# Patient Record
Sex: FEMALE | Race: BLACK OR AFRICAN AMERICAN | Employment: UNEMPLOYED | URBAN - METROPOLITAN AREA
[De-identification: names, ages, dates, MRNs, and addresses within clinical notes are randomized per-mention and may not be internally consistent; named-entity substitution may affect disease eponyms.]

---

## 2017-09-14 ENCOUNTER — HOSPITAL ENCOUNTER (EMERGENCY)
Age: 27
Discharge: HOME | End: 2017-09-14
Attending: EMERGENCY MEDICINE
Payer: COMMERCIAL

## 2017-09-14 ENCOUNTER — APPOINTMENT (OUTPATIENT)
Dept: ULTRASOUND IMAGING | Age: 27
End: 2017-09-14
Attending: EMERGENCY MEDICINE
Payer: COMMERCIAL

## 2017-09-14 VITALS
WEIGHT: 215 LBS | BODY MASS INDEX: 33.74 KG/M2 | SYSTOLIC BLOOD PRESSURE: 110 MMHG | TEMPERATURE: 98.8 F | OXYGEN SATURATION: 99 % | DIASTOLIC BLOOD PRESSURE: 65 MMHG | HEART RATE: 78 BPM | HEIGHT: 67 IN | RESPIRATION RATE: 18 BRPM

## 2017-09-14 DIAGNOSIS — Z34.90 INTRAUTERINE PREGNANCY: ICD-10-CM

## 2017-09-14 DIAGNOSIS — N94.9 ADNEXAL CYST: Primary | ICD-10-CM

## 2017-09-14 LAB
ALBUMIN SERPL-MCNC: 3.5 G/DL (ref 3.5–5)
ALBUMIN/GLOB SERPL: 0.7 {RATIO} (ref 1.2–3.5)
ALP SERPL-CCNC: 91 U/L (ref 50–136)
ALT SERPL-CCNC: 37 U/L (ref 12–65)
ANION GAP SERPL CALC-SCNC: 10 MMOL/L (ref 7–16)
AST SERPL-CCNC: 47 U/L (ref 15–37)
BILIRUB SERPL-MCNC: 0.3 MG/DL (ref 0.2–1.1)
BUN SERPL-MCNC: 8 MG/DL (ref 6–23)
CALCIUM SERPL-MCNC: 8.9 MG/DL (ref 8.3–10.4)
CHLORIDE SERPL-SCNC: 101 MMOL/L (ref 98–107)
CO2 SERPL-SCNC: 27 MMOL/L (ref 21–32)
CREAT SERPL-MCNC: 0.6 MG/DL (ref 0.6–1)
ERYTHROCYTE [DISTWIDTH] IN BLOOD BY AUTOMATED COUNT: 15 % (ref 11.9–14.6)
GLOBULIN SER CALC-MCNC: 4.8 G/DL (ref 2.3–3.5)
GLUCOSE SERPL-MCNC: 100 MG/DL (ref 65–100)
HCG SERPL-ACNC: ABNORMAL MIU/ML (ref 0–6)
HCG UR QL: POSITIVE
HCT VFR BLD AUTO: 36.4 % (ref 35.8–46.3)
HGB BLD-MCNC: 11.5 G/DL (ref 11.7–15.4)
MCH RBC QN AUTO: 27.6 PG (ref 26.1–32.9)
MCHC RBC AUTO-ENTMCNC: 31.6 G/DL (ref 31.4–35)
MCV RBC AUTO: 87.3 FL (ref 79.6–97.8)
PLATELET # BLD AUTO: 387 K/UL (ref 150–450)
PMV BLD AUTO: 9.2 FL (ref 10.8–14.1)
POTASSIUM SERPL-SCNC: 4.1 MMOL/L (ref 3.5–5.1)
PROT SERPL-MCNC: 8.3 G/DL (ref 6.3–8.2)
RBC # BLD AUTO: 4.17 M/UL (ref 4.05–5.25)
SODIUM SERPL-SCNC: 138 MMOL/L (ref 136–145)
WBC # BLD AUTO: 14.5 K/UL (ref 4.3–11.1)

## 2017-09-14 PROCEDURE — 81025 URINE PREGNANCY TEST: CPT

## 2017-09-14 PROCEDURE — 99284 EMERGENCY DEPT VISIT MOD MDM: CPT | Performed by: EMERGENCY MEDICINE

## 2017-09-14 PROCEDURE — 84702 CHORIONIC GONADOTROPIN TEST: CPT | Performed by: EMERGENCY MEDICINE

## 2017-09-14 PROCEDURE — 80053 COMPREHEN METABOLIC PANEL: CPT | Performed by: EMERGENCY MEDICINE

## 2017-09-14 PROCEDURE — 85027 COMPLETE CBC AUTOMATED: CPT | Performed by: EMERGENCY MEDICINE

## 2017-09-14 PROCEDURE — 76817 TRANSVAGINAL US OBSTETRIC: CPT

## 2017-09-14 PROCEDURE — 81003 URINALYSIS AUTO W/O SCOPE: CPT | Performed by: EMERGENCY MEDICINE

## 2017-09-14 RX ORDER — SWAB
1 SWAB, NON-MEDICATED MISCELLANEOUS DAILY
COMMUNITY

## 2017-09-14 RX ORDER — FLUOXETINE HYDROCHLORIDE 40 MG/1
40 CAPSULE ORAL DAILY
COMMUNITY
End: 2017-10-17

## 2017-09-14 NOTE — DISCHARGE INSTRUCTIONS
Vaginal Bleeding During Pregnancy: After Your Visit to the Emergency Room  Your Care Instructions  Several things can cause bleeding during pregnancy. Some are serious, but others are not a cause for worry. Your doctor does not think that your bleeding is a sign of a serious problem. But you may need follow-up appointments or further testing to be sure everything is okay or to find the cause of the bleeding. Even though you have been released from the emergency room, you still need to watch for any problems. The doctor carefully checked you. But sometimes problems can develop later. If you have new symptoms, or if your symptoms do not get better, return to the emergency room or call your doctor right away. A visit to the emergency room is only one step in your treatment. Even if you feel better, you still need to do what your doctor recommends, such as going to all suggested follow-up appointments and taking medicines exactly as directed. This will help you recover and help prevent future problems. How can you care for yourself at home? · If your doctor prescribed medicines, take them exactly as directed. Call your doctor if you think you are having a problem with your medicine. · Do not have sex unless your doctor says it is safe. · Rest until your doctor tells you how much activity is safe. · Do not use nonsteroidal anti-inflammatory drugs (NSAIDs), such as ibuprofen (Advil, Motrin), naproxen (Aleve), or aspirin, unless you have talked to your doctor about it. When should you call for help? Call 911 if:  · You have severe vaginal bleeding. · You have severe pain in your belly or pelvis. · You have had fluid gushing or leaking from your vagina and you know or think the umbilical cord is bulging into your vagina. If this happens, immediately get down on your knees so your rear end (buttocks) is higher than your head. This will decrease the pressure on the cord until help arrives.   · You passed out (lost consciousness). · You have any other symptoms that you think are a medical emergency. Return to the emergency room now if:  · You have signs of preeclampsia, such as:  ¨ Sudden swelling of your face, hands, or feet. ¨ New vision problems (such as dimness or blurring). ¨ A severe headache. · You have new vaginal bleeding, or you pass clots of tissue from your vagina. · You have vaginal discharge that smells bad. · Vaginal bleeding has not stopped completely after 1 or 2 days. · You have new pain, pressure, or cramps in your belly, pelvis, or low back. · You have a new fever. · You have had regular contractions (with or without pain) for an hour. This means that you have 8 or more within 1 hour or 4 or more in 20 minutes after you change your position and drink fluids. · You have a sudden release of fluid from your vagina. · You notice that your baby has stopped moving or is moving much less than normal.   Where can you learn more? Go to [employer]  Enter V128 in the search box to learn more about \"Vaginal Bleeding During Pregnancy: After Your Visit to the Emergency Room. \"   © 1265-8307 [employer]. Care instructions adapted under license by Marissa Acevedo (which disclaims liability or warranty for this information). This care instruction is for use with your licensed healthcare professional. If you have questions about a medical condition or this instruction, always ask your healthcare professional. [place] [address] any warranty or liability for your use of this information. Content Version: 9.4.42279; Last Revised: March 24, 2011               Functional Ovarian Cyst: Care Instructions  Your Care Instructions    A functional ovarian cyst is a sac that forms on the surface of a woman's ovary during ovulation. The sac holds a maturing egg. Usually the sac goes away after the egg is released.  But if the egg is not released, or if the sac closes up after the egg is released, the sac can swell up with fluid and form a cyst.  Functional ovarian cysts are different than ovarian growths caused by other problems, such as cancer. Most functional ovarian cysts cause no symptoms and go away on their own. Some cause mild pain. Others can cause severe pain when they rupture or bleed. Follow-up care is a key part of your treatment and safety. Be sure to make and go to all appointments, and call your doctor if you are having problems. It's also a good idea to know your test results and keep a list of the medicines you take. How can you care for yourself at home? · Use heat, such as a hot water bottle, a heating pad set on low, or a warm bath, to relax tense muscles and relieve cramping. · Take pain medicines exactly as directed. ¨ If the doctor gave you a prescription medicine for pain, take it as prescribed. ¨ If you are not taking a prescription pain medicine, ask your doctor if you can take an over-the-counter medicine. · Avoid constipation. Make sure you drink enough fluids and include fruits, vegetables, and fiber in your diet each day. Constipation does not cause ovarian cysts, but it may make your pelvic pain worse. When should you call for help? Call 911 anytime you think you may need emergency care. For example, call if:  · You passed out (lost consciousness). · You have sudden, severe pain in your belly or your pelvis. Call your doctor now or seek immediate medical care if:  · You have new belly or pelvic pain, or your pain gets worse. · You have severe vaginal bleeding. This means that you are soaking through your usual pads or tampons each hour for 2 or more hours. · You are dizzy or lightheaded, or you feel like you may faint. · You have pain or bleeding during or after sex. Watch closely for changes in your health, and be sure to contact your doctor if:  · Your pain keeps you from doing the things that you enjoy.   · You do not get better as expected. Where can you learn more? Go to http://da-marah.info/. Enter Z478 in the search box to learn more about \"Functional Ovarian Cyst: Care Instructions. \"  Current as of: October 13, 2016  Content Version: 11.3  © 4592-8667 [employer]. Care instructions adapted under license by [employer] (which disclaims liability or warranty for this information). If you have questions about a medical condition or this instruction, always ask your healthcare professional. [address] any warranty or liability for your use of this information. Results for orders placed or performed during the hospital encounter of 09/14/17   CBC W/O DIFF   Result Value Ref Range    WBC 14.5 (H) 4.3 - 11.1 K/uL    RBC 4.17 4.05 - 5.25 M/uL    HGB 11.5 (L) 11.7 - 15.4 g/dL    HCT 36.4 35.8 - 46.3 %    MCV 87.3 79.6 - 97.8 FL    MCH 27.6 26.1 - 32.9 PG    MCHC 31.6 31.4 - 35.0 g/dL    RDW 15.0 (H) 11.9 - 14.6 %    PLATELET 003 375 - 308 K/uL    MPV 9.2 (L) 10.8 - 42.8 FL   METABOLIC PANEL, COMPREHENSIVE   Result Value Ref Range    Sodium 138 136 - 145 mmol/L    Potassium 4.1 3.5 - 5.1 mmol/L    Chloride 101 98 - 107 mmol/L    CO2 27 21 - 32 mmol/L    Anion gap 10 7 - 16 mmol/L    Glucose 100 65 - 100 mg/dL    BUN 8 6 - 23 MG/DL    Creatinine 0.60 0.6 - 1.0 MG/DL    GFR est AA >60 >60 ml/min/1.73m2    GFR est non-AA >60 >60 ml/min/1.73m2    Calcium 8.9 8.3 - 10.4 MG/DL    Bilirubin, total 0.3 0.2 - 1.1 MG/DL    ALT (SGPT) 37 12 - 65 U/L    AST (SGOT) 47 (H) 15 - 37 U/L    Alk.  phosphatase 91 50 - 136 U/L    Protein, total 8.3 (H) 6.3 - 8.2 g/dL    Albumin 3.5 3.5 - 5.0 g/dL    Globulin 4.8 (H) 2.3 - 3.5 g/dL    A-G Ratio 0.7 (L) 1.2 - 3.5     BETA HCG, QT   Result Value Ref [place] [address] (H) 0.0 - 6.0 MIU/ML   HCG URINE, QL. - POC   Result Value Ref Range    Pregnancy test,urine (POC) POSITIVE (A) NEG        [employer]    Result Date: 9/14/2017  Obstetric Ultrasound CLINICAL INDICATION: 2 weeks of left pelvic pain and vaginal bleeding, 51-year-old pregnant female with LMP 7/27/2017 and hCG value 36,100 Comparison: none FINDINGS: Transabdominal and endovaginal pelvic ultrasound performed. Transabdominal imaging demonstrates no gross ascites, and partial fluid distention of urinary bladder. Uterus measures 9.2 x 5.8 x 5.2 cm. Endovaginal imaging is performed to further evaluate pelvic organs. There is a single living intrauterine gestational sac. Intrauterine pregnancy. A normal appearing yolk sac is identified within an intrauterine gestational sac. Minimal simple pelvic free fluid is nonspecific and could be physiologic. Embryonic heart motion, not seen at this time. No fetal pole is seen at this time. Mean gestational sac diameter: 2.05 cm, consistent with 6 weeks 4 days. Right ovary measures 4.5 x 2.0 x 3.1 cm, left ovary 4.8 x 2.6 x 4.3 cm. There is a 3.6 x 2.4 x 3.8 cm complex left adnexal cyst with internal heterogeneous echogenicity but no significant Doppler flow. There is an echogenic 1.5 cm right ovarian mass that may represent hemorrhagic cyst, less likely dermoid cyst. There are small normal-appearing peripheral follicles in the right ovary. Doppler evaluation demonstrates bilateral ovarian flow. IMPRESSION: 1. Intrauterine gestational sac and yolk sac, measuring 6 weeks 4 days. 2. No fetal pole or heart tones at this time. 3. Complex 3.6 cm left adnexal mass and echogenic 1.5 cm right adnexal mass of indeterminate etiology, possibly hemorrhagic. Recommend follow-up as directed by OB/GYN.

## 2017-09-14 NOTE — ED TRIAGE NOTES
Patient arrives with her boyfriend, with CC of RLQ ABD pain. This pain is described as intermitted cramping, that started 3 weeks ago. She stated that she did not call her OB to be seen due to already having an appointment on the 21st. Reports dizziness \"sometimes\" with nausea. Denies vomiting, denies diarrhea.  Sleeping makes the pain better

## 2017-09-14 NOTE — ED NOTES
Pt now reports that shoulder pain is worse than her abdominal pain. This pain started when she woke up.

## 2017-09-14 NOTE — ED PROVIDER NOTES
HPI Comments: Patient is a 59-year-old female who is coming in with chief complaint of pain behind her left shoulder blade. It is worse and she took off her jacket. She denies any trauma. She denies any trouble breathing with this or chest pains. She also states that she is 6 weeks pregnant has had some right-sided pelvic discomfort as well as some spotting. No vaginal discharge. She's felt a little bit dizzy and tired as well. She has appointment next week with OB but has not yet seen them and not yet had an ultrasound. No prior history of ectopic. Patient is a 24 y.o. female presenting with abdominal pain and pregnancy problem. The history is provided by the patient. Abdominal Pain    Pertinent negatives include no fever, no diarrhea, no nausea, no vomiting and no chest pain. Pregnancy Problem    Pertinent negatives include no fever, no diarrhea, no nausea, no vomiting and no chest pain. Past Medical History:   Diagnosis Date    Psychiatric disorder     depression       History reviewed. No pertinent surgical history. History reviewed. No pertinent family history. Social History     Social History    Marital status: SINGLE     Spouse name: N/A    Number of children: N/A    Years of education: N/A     Occupational History    Not on file. Social History Main Topics    Smoking status: Never Smoker    Smokeless tobacco: Never Used    Alcohol use No      Comment: stopped when known pregnancy    Drug use: No    Sexual activity: Not on file     Other Topics Concern    Not on file     Social History Narrative    No narrative on file         ALLERGIES: Review of patient's allergies indicates no known allergies. Review of Systems   Constitutional: Negative for chills and fever. Respiratory: Negative for chest tightness, shortness of breath, wheezing and stridor. Cardiovascular: Negative for chest pain and palpitations.    Gastrointestinal: Negative for abdominal pain, diarrhea, nausea and vomiting. Genitourinary: Positive for pelvic pain and vaginal bleeding. Negative for vaginal discharge and vaginal pain. Skin: Negative. All other systems reviewed and are negative. Vitals:    09/14/17 1416   BP: 115/67   Pulse: 80   Resp: 20   Temp: 98.8 °F (37.1 °C)   SpO2: 98%   Weight: 97.5 kg (215 lb)   Height: 5' 7\" (1.702 m)            Physical Exam   Constitutional: She is oriented to person, place, and time. She appears well-developed and well-nourished. No distress. HENT:   Head: Normocephalic and atraumatic. Eyes: Conjunctivae are normal. No scleral icterus. Neck: Normal range of motion. Neck supple. Cardiovascular: Normal rate, regular rhythm and normal heart sounds. Pulmonary/Chest: Effort normal and breath sounds normal. No stridor. No respiratory distress. She has no wheezes. She has no rales. She exhibits no tenderness. Abdominal: Soft. Tenderness: minimal right pelvic discomfort with palpation. There is no rebound and no guarding. Musculoskeletal:   There is pain over the left posterior shoulder blade. Full range of motion of the left shoulder without any discomfort. Twisting motions of the back to reproduce the pain though. Neurological: She is alert and oriented to person, place, and time. No cranial nerve deficit. Coordination normal.   No focal weakness   Skin: Skin is warm and dry. No rash noted. She is not diaphoretic. Psychiatric: She has a normal mood and affect. Her behavior is normal.   Nursing note and vitals reviewed. MDM  Number of Diagnoses or Management Options  Adnexal cyst:   Intrauterine pregnancy:   Diagnosis management comments: Patient has IUP. Abdomen is benign she is following up with her ob there are small adnexal mass seen this does not correlate to her exam.  Patient states she has normal chlamydia and gonorrhea.       Nilam Dubose MD; 9/14/2017 @6:38 PM Voice dictation software was used during the making of this note. This software is not perfect and grammatical and other typographical errors may be present.   This note has not been proofread for errors.  ===================================================================        Amount and/or Complexity of Data Reviewed  Clinical lab tests: ordered and reviewed (Results for orders placed or performed during the hospital encounter of 09/14/17  -CBC W/O DIFF       Result                                            Value                         Ref Range                       WBC                                               14.5 (H)                      4.3 - 11.1 K/uL                 RBC                                               4.17                          4.05 - 5.25 M/uL                HGB                                               11.5 (L)                      11.7 - 15.4 g/dL                HCT                                               36.4                          35.8 - 46.3 %                   MCV                                               87.3                          79.6 - 97.8 FL                  MCH                                               27.6                          26.1 - 32.9 PG                  MCHC                                              31.6                          31.4 - 35.0 g/dL                RDW                                               15.0 (H)                      11.9 - 14.6 %                   PLATELET                                          387                           150 - 450 K/uL                  MPV                                               9.2 (L)                       10.8 - 14.1 FL             -METABOLIC PANEL, COMPREHENSIVE       Result                                            Value                         Ref Range                       Sodium                                            138                           136 - 145 mmol/L                Potassium 4.1                           3.5 - 5.1 mmol/L                Chloride                                          101                           98 - 107 mmol/L                 CO2                                               27                            21 - 32 mmol/L                  Anion gap                                         10                            7 - 16 mmol/L                   Glucose                                           100                           65 - 100 mg/dL                  BUN                                               8                             6 - 23 MG/DL                    Creatinine                                        0.60                          0.6 - 1.0 MG/DL                 GFR est AA                                        >60                           >60 ml/min/1.73m2               GFR est non-AA                                    >60                           >60 ml/min/1.73m2               Calcium                                           8.9                           8.3 - 10.4 MG/DL                Bilirubin, total                                  0.3                           0.2 - 1.1 MG/DL                 ALT (SGPT)                                        37                            12 - 65 U/L                     AST (SGOT)                                        47 (H)                        15 - 37 U/L                     Alk. phosphatase                                  91                            50 - 136 U/L                    Protein, total                                    8.3 (H)                       6.3 - 8.2 g/dL                  Albumin                                           3.5                           3.5 - 5.0 g/dL                  Globulin                                          4.8 (H)                       2.3 - 3.5 g/dL                  A-G Ratio                                         0.7 (L)                       1.2 - 3.5 -BETA HCG, QT       Result                                            Value                         Ref Range                       Beta HCG, QT                                      [address] (H)                     0.0 - 6.0 MIU/ML           -HCG URINE, QL. - POC       Result                                            Value                         Ref Range                       Pregnancy test,urine (POC)                        POSITIVE (A)                  NEG                       )  Tests in the radiology section of CPT®: ordered and reviewed (Tammy Ibarra    Result Date: 9/14/2017  Obstetric Ultrasound CLINICAL INDICATION: 2 weeks of left pelvic pain and vaginal bleeding, 74-year-old pregnant female with LMP 7/27/2017 and hCG value 36,100 Comparison: none FINDINGS: Transabdominal and endovaginal pelvic ultrasound performed. Transabdominal imaging demonstrates no gross ascites, and partial fluid distention of urinary bladder. Uterus measures 9.2 x 5.8 x 5.2 cm. Endovaginal imaging is performed to further evaluate pelvic organs. There is a single living intrauterine gestational sac. Intrauterine pregnancy. A normal appearing yolk sac is identified within an intrauterine gestational sac. Minimal simple pelvic free fluid is nonspecific and could be physiologic. Embryonic heart motion, not seen at this time. No fetal pole is seen at this time. Mean gestational sac diameter: 2.05 cm, consistent with 6 weeks 4 days. Right ovary measures 4.5 x 2.0 x 3.1 cm, left ovary 4.8 x 2.6 x 4.3 cm. There is a 3.6 x 2.4 x 3.8 cm complex left adnexal cyst with internal heterogeneous echogenicity but no significant Doppler flow. There is an echogenic 1.5 cm right ovarian mass that may represent hemorrhagic cyst, less likely dermoid cyst. There are small normal-appearing peripheral follicles in the right ovary. Doppler evaluation demonstrates bilateral ovarian flow. IMPRESSION: 1.  Intrauterine gestational sac and yolk sac, measuring 6 weeks 4 days. 2. No fetal pole or heart tones at this time. 3. Complex 3.6 cm left adnexal mass and echogenic 1.5 cm right adnexal mass of indeterminate etiology, possibly hemorrhagic.  Recommend follow-up as directed by OB/GYN.     )      ED Course       Procedures

## 2017-09-14 NOTE — ED NOTES
I have reviewed discharge instructions with the patient. The patient verbalized understanding. Patient left ED via Discharge Method: ambulatory to Home with boyfriend. Opportunity for questions and clarification provided. Patient given 0 scripts.

## 2017-09-21 PROBLEM — Z34.81 MULTIGRAVIDA IN FIRST TRIMESTER: Status: ACTIVE | Noted: 2017-09-21

## 2017-09-21 PROBLEM — F32.A DEPRESSION AFFECTING PREGNANCY IN FIRST TRIMESTER, ANTEPARTUM: Status: ACTIVE | Noted: 2017-09-21

## 2017-09-21 PROBLEM — O99.341 DEPRESSION AFFECTING PREGNANCY IN FIRST TRIMESTER, ANTEPARTUM: Status: ACTIVE | Noted: 2017-09-21

## 2017-10-07 LAB
CHLAMYDIA, EXTERNAL: NEGATIVE
HBSAG, EXTERNAL: NEGATIVE
HEPATITIS C AB,   EXT: NEGATIVE
HIV, EXTERNAL: NONREACTIVE
N. GONORRHEA, EXTERNAL: NEGATIVE
RPR, EXTERNAL: NORMAL
RUBELLA, EXTERNAL: NORMAL

## 2017-11-14 PROBLEM — Z34.82 MULTIGRAVIDA IN SECOND TRIMESTER: Status: ACTIVE | Noted: 2017-09-21

## 2018-02-06 PROBLEM — O36.8120 DECREASED FETAL MOVEMENTS IN SECOND TRIMESTER: Status: ACTIVE | Noted: 2018-02-06

## 2018-02-13 PROBLEM — O36.8120 DECREASED FETAL MOVEMENTS IN SECOND TRIMESTER: Status: RESOLVED | Noted: 2018-02-06 | Resolved: 2018-02-13

## 2018-02-14 PROBLEM — O99.013 ANEMIA IN PREGNANCY, THIRD TRIMESTER: Status: ACTIVE | Noted: 2018-02-14

## 2018-03-27 PROBLEM — B96.89 BACTERIAL VAGINOSIS: Status: ACTIVE | Noted: 2018-03-27

## 2018-03-27 PROBLEM — N76.0 BACTERIAL VAGINOSIS: Status: ACTIVE | Noted: 2018-03-27

## 2018-03-29 PROBLEM — O99.713 PRURITUS OF PREGNANCY IN THIRD TRIMESTER: Status: ACTIVE | Noted: 2018-03-29

## 2018-03-29 PROBLEM — L29.9 PRURITUS OF PREGNANCY IN THIRD TRIMESTER: Status: ACTIVE | Noted: 2018-03-29

## 2018-04-02 LAB — GRBS, EXTERNAL: POSITIVE

## 2018-04-05 PROBLEM — Z34.83 MULTIGRAVIDA IN THIRD TRIMESTER: Status: ACTIVE | Noted: 2017-09-21

## 2018-04-05 PROBLEM — N76.0 BACTERIAL VAGINOSIS: Status: RESOLVED | Noted: 2018-03-27 | Resolved: 2018-04-05

## 2018-04-05 PROBLEM — B96.89 BACTERIAL VAGINOSIS: Status: RESOLVED | Noted: 2018-03-27 | Resolved: 2018-04-05

## 2018-04-09 PROBLEM — O99.820 GBS (GROUP B STREPTOCOCCUS CARRIER), +RV CULTURE, CURRENTLY PREGNANT: Status: ACTIVE | Noted: 2018-04-09

## 2018-05-07 ENCOUNTER — HOSPITAL ENCOUNTER (INPATIENT)
Age: 28
LOS: 4 days | Discharge: HOME | DRG: 540 | End: 2018-05-11
Attending: OBSTETRICS & GYNECOLOGY | Admitting: OBSTETRICS & GYNECOLOGY
Payer: COMMERCIAL

## 2018-05-07 LAB
ERYTHROCYTE [DISTWIDTH] IN BLOOD BY AUTOMATED COUNT: 14.4 % (ref 11.9–14.6)
HCT VFR BLD AUTO: 36.3 % (ref 35.8–46.3)
HGB BLD-MCNC: 11.5 G/DL (ref 11.7–15.4)
MCH RBC QN AUTO: 28.3 PG (ref 26.1–32.9)
MCHC RBC AUTO-ENTMCNC: 31.7 G/DL (ref 31.4–35)
MCV RBC AUTO: 89.2 FL (ref 79.6–97.8)
PLATELET # BLD AUTO: 365 K/UL (ref 150–450)
PMV BLD AUTO: 9.4 FL (ref 10.8–14.1)
RBC # BLD AUTO: 4.07 M/UL (ref 4.05–5.25)
WBC # BLD AUTO: 11.3 K/UL (ref 4.3–11.1)

## 2018-05-07 PROCEDURE — 74011000258 HC RX REV CODE- 258: Performed by: OBSTETRICS & GYNECOLOGY

## 2018-05-07 PROCEDURE — 86900 BLOOD TYPING SEROLOGIC ABO: CPT | Performed by: OBSTETRICS & GYNECOLOGY

## 2018-05-07 PROCEDURE — 74011250636 HC RX REV CODE- 250/636: Performed by: OBSTETRICS & GYNECOLOGY

## 2018-05-07 PROCEDURE — 74011250637 HC RX REV CODE- 250/637: Performed by: OBSTETRICS & GYNECOLOGY

## 2018-05-07 PROCEDURE — 85027 COMPLETE CBC AUTOMATED: CPT | Performed by: OBSTETRICS & GYNECOLOGY

## 2018-05-07 PROCEDURE — 65270000029 HC RM PRIVATE

## 2018-05-07 RX ORDER — OXYTOCIN/RINGER'S LACTATE 30/500 ML
.5-2 PLASTIC BAG, INJECTION (ML) INTRAVENOUS
Status: DISCONTINUED | OUTPATIENT
Start: 2018-05-07 | End: 2018-05-08 | Stop reason: HOSPADM

## 2018-05-07 RX ORDER — LIDOCAINE HYDROCHLORIDE 20 MG/ML
JELLY TOPICAL
Status: DISCONTINUED | OUTPATIENT
Start: 2018-05-07 | End: 2018-05-08 | Stop reason: HOSPADM

## 2018-05-07 RX ORDER — LIDOCAINE HYDROCHLORIDE 10 MG/ML
1 INJECTION INFILTRATION; PERINEURAL
Status: DISCONTINUED | OUTPATIENT
Start: 2018-05-07 | End: 2018-05-08 | Stop reason: HOSPADM

## 2018-05-07 RX ORDER — MORPHINE SULFATE 10 MG/ML
5 INJECTION, SOLUTION INTRAMUSCULAR; INTRAVENOUS
Status: DISCONTINUED | OUTPATIENT
Start: 2018-05-07 | End: 2018-05-08 | Stop reason: HOSPADM

## 2018-05-07 RX ORDER — MINERAL OIL
120 OIL (ML) ORAL
Status: DISCONTINUED | OUTPATIENT
Start: 2018-05-07 | End: 2018-05-08 | Stop reason: HOSPADM

## 2018-05-07 RX ADMIN — MISOPROSTOL 50 MCG: 100 TABLET ORAL at 23:39

## 2018-05-07 RX ADMIN — SODIUM CHLORIDE 5 MILLION UNITS: 900 INJECTION, SOLUTION INTRAVENOUS at 23:31

## 2018-05-07 NOTE — IP AVS SNAPSHOT
[address] 
[phone] Patient: Roz Lund MRN: [id number] Corinne Olvera A check libia indicates which time of day the medication should be taken. My Medications START taking these medications Instructions Each Dose to Equal  
 Morning Noon Evening Bedtime  
 ibuprofen 600 mg tablet Commonly known as:  MOTRIN Your last dose was: Your next dose is: Take 1 Tab by mouth every six (6) hours as needed. 600 mg  
    
   
   
   
  
 norethindrone 0.35 mg Tab Commonly known as:  Abundio & Abundio Your last dose was: Your next dose is: Take 1 Tab by mouth daily. 1 Tab  
    
   
   
   
  
 oxyCODONE-acetaminophen 5-325 mg per tablet Commonly known as:  PERCOCET Your last dose was: Your next dose is: Take 1 Tab by mouth every four (4) hours as needed for Pain. Max Daily Amount: 6 Tabs. 1 Tab CONTINUE taking these medications Instructions Each Dose to Equal  
 Morning Noon Evening Bedtime Iron 325 mg (65 mg iron) tablet Generic drug:  ferrous sulfate Your last dose was: Your next dose is: Take  by mouth Daily (before breakfast). STOOL SOFTENER 100 mg Tab Generic drug:  docusate sodium Your last dose was: Your next dose is: Take  by mouth. JOSE RAMON PRENATAL 28 mg iron- 800 mcg Tab Generic drug:  prenatal vit-iron fumarate-fa Your last dose was: Your next dose is: Take 1 Tab by mouth daily. 1 Tab Where to Get Your Medications These medications were sent to [address] [place] [address], [address] [place] [address] Entrance  [address] [facility] Phone:  [phone]  
  ibuprofen 600 mg tablet norethindrone 0.35 mg Tab Information on where to get these meds will be given to you by the nurse or doctor. ! Ask your nurse or doctor about these medications  
  oxyCODONE-acetaminophen 5-325 mg per tablet

## 2018-05-07 NOTE — IP AVS SNAPSHOT
[address] [id number]  [address] 
[phone] Patient: Sin Redd MRN: [id number] Alex Hinkle About your hospitalization You were admitted on: May 7, 2018 You last received care in the:  [address] You were discharged on:  May 11, 2018 Why you were hospitalized Your primary diagnosis was:  Not on File Your diagnoses also included:  Primigravida In Third Trimester, Normal Labor, 40 Weeks Gestation Of Pregnancy Follow-up Information Follow up With Details Comments Contact Info None   None ([phone]) Patient stated that they have no PCP Your Scheduled Appointments Thursday May 24, 2018 11:00 AM EDT  
EST GYN with MD CHAITANYA Sharma OB-GYN ([facility]) [address] [facility] [address]  
[phone] Wednesday June 20, 2018  1:30 PM EDT  
EST GYN with MD CHAITANYA Sharma OB-GYN ([facility]) [address] [facility] [address]  
[phone] Discharge Orders None A check libia indicates which time of day the medication should be taken. My Medications START taking these medications Instructions Each Dose to Equal  
 Morning Noon Evening Bedtime  
 ibuprofen 600 mg tablet Commonly known as:  MOTRIN Your last dose was: Your next dose is: Take 1 Tab by mouth every six (6) hours as needed. 600 mg  
    
   
   
   
  
 norethindrone 0.35 mg Tab Commonly known as:  Abundio & Abundio Your last dose was: Your next dose is: Take 1 Tab by mouth daily. 1 Tab  
    
   
   
   
  
 oxyCODONE-acetaminophen 5-325 mg per tablet Commonly known as:  PERCOCET Your last dose was: Your next dose is: Take 1 Tab by mouth every four (4) hours as needed for Pain. Max Daily Amount: 6 Tabs. 1 Tab CONTINUE taking these medications Instructions Each Dose to Equal  
 Morning Noon Evening Bedtime Iron 325 mg (65 mg iron) tablet Generic drug:  ferrous sulfate Your last dose was: Your next dose is: Take  by mouth Daily (before breakfast). STOOL SOFTENER 100 mg Tab Generic drug:  docusate sodium Your last dose was: Your next dose is: Take  by mouth. JOSE RAMON PRENATAL 28 mg iron- 800 mcg Tab Generic drug:  prenatal vit-iron fumarate-fa Your last dose was: Your next dose is: Take 1 Tab by mouth daily. 1 Tab Where to Get Your Medications These medications were sent to [address] [place] [address], [address] [place] [address] Entrance  [address], [address] [place] [address] Phone:  [phone]  
  ibuprofen 600 mg tablet  
 norethindrone 0.35 mg Tab Information on where to get these meds will be given to you by the nurse or doctor. ! Ask your nurse or doctor about these medications  
  oxyCODONE-acetaminophen 5-325 mg per tablet Opioid Education Prescription Opioids: What You Need to Know: 
 
Prescription opioids can be used to help relieve moderate-to-severe pain and are often prescribed following a surgery or injury, or for certain health conditions. These medications can be an important part of treatment but also come with serious risks. Opioids are strong pain medicines. Examples include hydrocodone, oxycodone, fentanyl, and morphine. Heroin is an example of an illegal opioid. It is important to work with your health care provider to make sure you are getting the safest, most effective care. WHAT ARE THE RISKS AND SIDE EFFECTS OF OPIOID USE?  
Prescription opioids carry serious risks of addiction and overdose, especially with prolonged use. An opioid overdose, often marked by slow breathing, can cause sudden death. The use of prescription opioids can have a number of side effects as well, even when taken as directed. · Tolerance-meaning you might need to take more of a medication for the same pain relief · Physical dependence-meaning you have symptoms of withdrawal when the medication is stopped. Withdrawal symptoms can include nausea, sweating, chills, diarrhea, stomach cramps, and muscle aches. Withdrawal can last up to several weeks, depending on which drug you took and how long you took it. · Increased sensitivity to pain · Constipation · Nausea, vomiting, and dry mouth · Sleepiness and dizziness · Confusion · Depression · Low levels of testosterone that can result in lower sex drive, energy, and strength · Itching and sweating RISKS ARE GREATER WITH:      
· History of drug misuse, substance use disorder, or overdose · Mental health conditions (such as depression or anxiety) · Sleep apnea · Older age (72 years or older) · Pregnancy Avoid alcohol while taking prescription opioids. Also, unless specifically advised by your health care provider, medications to avoid include: · Benzodiazepines (such as Xanax or Valium) · Muscle relaxants (such as Soma or Flexeril) · Hypnotics (such as Ambien or Lunesta) · Other prescription opioids KNOW YOUR OPTIONS Talk to your health care provider about ways to manage your pain that don't involve prescription opioids. Some of these options may actually work better and have fewer risks and side effects. Options may include: 
· Pain relievers such as acetaminophen, ibuprofen, and naproxen · Some medications that are also used for depression or seizures · Physical therapy and exercise · Counseling to help patients learn how to cope better with triggers of pain and stress. · Application of heat or cold compress · Massage therapy · Relaxation techniques Be Informed Make sure you know the name of your medication, how much and how often to take it, and its potential risks & side effects. IF YOU ARE PRESCRIBED OPIOIDS FOR PAIN: 
· Never take opioids in greater amounts or more often than prescribed. Remember the goal is not to be pain-free but to manage your pain at a tolerable level. · Follow up with your primary care provider to: · Work together to create a plan on how to manage your pain. · Talk about ways to help manage your pain that don't involve prescription opioids. · Talk about any and all concerns and side effects. · Help prevent misuse and abuse. · Never sell or share prescription opioids · Help prevent misuse and abuse. · Store prescription opioids in a secure place and out of reach of others (this may include visitors, children, friends, and family). · Safely dispose of unused/unwanted prescription opioids: Find your community drug take-back program or your pharmacy mail-back program, or flush them down the toilet, following guidance from the Food and Drug Administration (www.fda.gov/Drugs/ResourcesForYou). · Visit www.cdc.gov/drugoverdose to learn about the risks of opioid abuse and overdose. · If you believe you may be struggling with addiction, tell your health care provider and ask for guidance or call [facility] at 8-120-539-BUZE. Discharge Instructions  Section: What to Expect at [facility] Your Recovery A  section, or , is surgery to deliver your baby through a cut, called an incision, that the doctor makes in your lower belly and uterus. You may have some pain in your lower belly and need pain medicine for 1 to 2 weeks. You can expect some vaginal bleeding for several weeks. You will probably need about 6 weeks to fully recover. It is important to take it easy while the incision is healing. Avoid heavy lifting, strenuous activities, or exercises that strain the belly muscles while you are recovering. Ask a family member or friend for help with housework, cooking, and shopping. This care sheet gives you a general idea about how long it will take for you to recover. But each person recovers at a different pace. Follow the steps below to get better as quickly as possible. How can you care for yourself at home? Activity ? · Rest when you feel tired. Getting enough sleep will help you recover. ? · Try to walk each day. Start by walking a little more than you did the day before. Bit by bit, increase the amount you walk. Walking boosts blood flow and helps prevent pneumonia, constipation, and blood clots. ? · Avoid strenuous activities, such as bicycle riding, jogging, weightlifting, and aerobic exercise, for 6 weeks or until your doctor says it is okay. ? · Until your doctor says it is okay, do not lift anything heavier than your baby. ? · Do not do sit-ups or other exercises that strain the belly muscles for 6 weeks or until your doctor says it is okay. ? · Hold a pillow over your incision when you cough or take deep breaths. This will support your belly and decrease your pain. ? · You may shower as usual. Pat the incision dry when you are done. ? · You will have some vaginal bleeding. Wear sanitary pads. Do not douche or use tampons until your doctor says it is okay. ? · Ask your doctor when you can drive again. ? · You will probably need to take at least 6 weeks off work. It depends on the type of work you do and how you feel. ? · Ask your doctor when it is okay for you to have sex. Diet ? · You can eat your normal diet. If your stomach is upset, try bland, low-fat foods like plain rice, broiled chicken, toast, and yogurt. ? · Drink plenty of fluids (unless your doctor tells you not to). ? · You may notice that your bowel movements are not regular right after your surgery. This is common. Try to avoid constipation and straining with bowel movements. You may want to take a fiber supplement every day. If you have not had a bowel movement after a couple of days, ask your doctor about taking a mild laxative. ? · If you are breastfeeding, do not drink any alcohol. Medicines ? · Your doctor will tell you if and when you can restart your medicines. He or she will also give you instructions about taking any new medicines. ? · If you take blood thinners, such as warfarin (Coumadin), clopidogrel (Plavix), or aspirin, be sure to talk to your doctor. He or she will tell you if and when to start taking those medicines again. Make sure that you understand exactly what your doctor wants you to do. ? · Take pain medicines exactly as directed. ¨ If the doctor gave you a prescription medicine for pain, take it as prescribed. ¨ If you are not taking a prescription pain medicine, ask your doctor if you can take an over-the-counter medicine. ? · If you think your pain medicine is making you sick to your stomach: 
¨ Take your medicine after meals (unless your doctor has told you not to). ¨ Ask your doctor for a different pain medicine. ? · If your doctor prescribed antibiotics, take them as directed. Do not stop taking them just because you feel better. You need to take the full course of antibiotics. Incision care ? · If you have strips of tape on the incision, leave the tape on for a week or until it falls off.  
? · Wash the area daily with warm, soapy water, and pat it dry. Don't use hydrogen peroxide or alcohol, which can slow healing. You may cover the area with a gauze bandage if it weeps or rubs against clothing. Change the bandage every day. ? · Keep the area clean and dry. Other instructions ?  · If you breastfeed your baby, you may be more comfortable while you are healing if you place the baby so that he or she is not resting on your belly. Try tucking your baby under your arm, with his or her body along the side you will be feeding on. Support your baby's upper body with your arm. With that hand you can control your baby's head to bring his or her mouth to your breast. This is sometimes called the football hold. Follow-up care is a key part of your treatment and safety. Be sure to make and go to all appointments, and call your doctor if you are having problems. It's also a good idea to know your test results and keep a list of the medicines you take. When should you call for help? Call 911 anytime you think you may need emergency care. For example, call if: 
? · You passed out (lost consciousness). ? · You have chest pain, are short of breath, or cough up blood. ?Call your doctor now or seek immediate medical care if: 
? · You have pain that does not get better after you take pain medicine. ? · You have severe vaginal bleeding. ? · You are dizzy or lightheaded, or you feel like you may faint. ? · You have new or worse pain in your belly or pelvis. ? · You have loose stitches, or your incision comes open. ? · You have symptoms of infection, such as: 
¨ Increased pain, swelling, warmth, or redness. ¨ Red streaks leading from the incision. ¨ Pus draining from the incision. ¨ A fever. ? · You have symptoms of a blood clot in your leg (called a deep vein thrombosis), such as: 
¨ Pain in your calf, back of the knee, thigh, or groin. ¨ Redness and swelling in your leg or groin. ? Watch closely for changes in your health, and be sure to contact your doctor if: 
? · You do not get better as expected. Where can you learn more? Go to http://da-marah.info/. Enter M806 in the search box to learn more about \" Section: What to Expect at Home. \" Current as of: 2017 Content Version: 11.4 © 4891-1729 Healthwise, [employer]. Care instructions adapted under license by [employer] (which disclaims liability or warranty for this information). If you have questions about a medical condition or this instruction, always ask your healthcare professional. [address] any warranty or liability for your use of this information. DISCHARGE SUMMARY from Nurse PATIENT INSTRUCTIONS: 
 
After general anesthesia or intravenous sedation, for 24 hours or while taking prescription Narcotics: · Limit your activities · Do not drive and operate hazardous machinery · Do not make important personal or business decisions · Do  not drink alcoholic beverages · If you have not urinated within 8 hours after discharge, please contact your surgeon on call. Report the following to your surgeon: 
· Excessive pain, swelling, redness or odor of or around the surgical area · Temperature over 100.5 · Nausea and vomiting lasting longer than 4 hours or if unable to take medications · Any signs of decreased circulation or nerve impairment to extremity: change in color, persistent  numbness, tingling, coldness or increase pain · Any questions What to do at Home: *  Please give a list of your current medications to your Primary Care Provider. *  Please update this list whenever your medications are discontinued, doses are 
    changed, or new medications (including over-the-counter products) are added. *  Please carry medication information at all times in case of emergency situations. These are general instructions for a healthy lifestyle: No smoking/ No tobacco products/ Avoid exposure to second hand smoke Surgeon General's Warning:  Quitting smoking now greatly reduces serious risk to your health. Obesity, smoking, and sedentary lifestyle greatly increases your risk for illness A healthy diet, regular physical exercise & weight monitoring are important for maintaining a healthy lifestyle You may be retaining fluid if you have a history of heart failure or if you experience any of the following symptoms:  Weight gain of 3 pounds or more overnight or 5 pounds in a week, increased swelling in our hands or feet or shortness of breath while lying flat in bed. Please call your doctor as soon as you notice any of these symptoms; do not wait until your next office visit. Recognize signs and symptoms of STROKE: 
 
F-face looks uneven A-arms unable to move or move unevenly S-speech slurred or non-existent T-time-call 911 as soon as signs and symptoms begin-DO NOT go Back to bed or wait to see if you get better-TIME IS BRAIN. Warning Signs of HEART ATTACK Call 911 if you have these symptoms: 
? Chest discomfort. Most heart attacks involve discomfort in the center of the chest that lasts more than a few minutes, or that goes away and comes back. It can feel like uncomfortable pressure, squeezing, fullness, or pain. ? Discomfort in other areas of the upper body. Symptoms can include pain or discomfort in one or both arms, the back, neck, jaw, or stomach. ? Shortness of breath with or without chest discomfort. ? Other signs may include breaking out in a cold sweat, nausea, or lightheadedness. Don't wait more than five minutes to call [address]! Fast action can save your life. Calling 911 is almost always the fastest way to get lifesaving treatment. Emergency Medical Services staff can begin treatment when they arrive  up to an hour sooner than if someone gets to the hospital by car. The discharge information has been reviewed with the patient. The patient verbalized understanding. Discharge medications reviewed with the patient and appropriate educational materials and side effects teaching were provided.  
___________________________________________________________________________ ________________________________________________________ Introducing [facility]! [facility] introduces [employer] patient portal. Now you can access parts of your medical record, email your doctor's office, and request medication refills online. 1. In your internet browser, go to [email or web address] 2. Click on the First Time User? Click Here link in the Sign In box. You will see the New Member Sign Up page. 3. Enter your [facility] Access Code exactly as it appears below. You will not need to use this code after youve completed the sign-up process. If you do not sign up before the expiration date, you must request a new code. · [facility] Access Code: [email or web address] Expires: 6/25/2018 11:25 AM 
 
4. Enter the last four digits of your Social Security Number (xxxx) and Date of Birth (mm/dd/yyyy) as indicated and click Submit. You will be taken to the next sign-up page. 5. Create a [facility] ID. This will be your [facility] login ID and cannot be changed, so think of one that is secure and easy to remember. 6. Create a [facility] password. You can change your password at any time. 7. Enter your Password Reset Question and Answer. This can be used at a later time if you forget your password. 8. Enter your e-mail address. You will receive e-mail notification when new information is available in [address] 9. Click Sign Up. You can now view and download portions of your medical record. 10. Click the Download Summary menu link to download a portable copy of your medical information. If you have questions, please visit the Frequently Asked Questions section of the [facility] website. Remember, [facility] is NOT to be used for urgent needs. For medical emergencies, dial 911. Now available from your iPhone and Android! Introducing Zheng June As a [facility] patient, I wanted to make you aware of our electronic visit tool called Zheng June. [employer] 24/7 allows you to connect within minutes with a medical provider 24 hours a day, seven days a week via a mobile device or tablet or logging into a secure website from your computer. You can access [employer] from anywhere in the [place]. A virtual visit might be right for you when you have a simple condition and feel like you just dont want to get out of bed, or cant get away from work for an appointment, when your regular [employer] provider is not available (evenings, weekends or holidays), or when youre out of town and need minor care. Electronic visits cost only $49 and if the [employer] 24/7 provider determines a prescription is needed to treat your condition, one can be electronically transmitted to a nearby pharmacy*. Please take a moment to enroll today if you have not already done so. The enrollment process is free and takes just a few minutes. To enroll, please download the [employer] 24/7 anju to your tablet or phone, or visit [email or web address] to enroll on your computer. And, as an [address] [place] [address] patient with a [employer] account, the results of your visits will be scanned into your electronic medical record and your primary care provider will be able to view the scanned results. We urge you to continue to see your regular [facility] provider for your ongoing medical care. And while your primary care provider may not be the one available when you seek a [email or web address] virtual visit, the peace of mind you get from getting a real diagnosis real time can be priceless. For more information on [employer], view our Frequently Asked Questions (FAQs) at [email or web address]. Sincerely, 
 
Davin Mcclellan MD 
Chief Medical Officer [facility] *:  certain medications cannot be prescribed via [employer] Providers Seen During Your Hospitalization Provider Specialty Primary office phone Mohit Muir MD Obstetrics & Gynecology [phone] Immunizations Administered for This Admission Name Date MMR  Deferred () Tdap 5/11/2018 Your Primary Care Physician (PCP) Primary Care Physician Office Phone Office Fax NONE ** None ** ** None ** You are allergic to the following No active allergies Recent Documentation Breastfeeding? OB Status Smoking Status Yes Recent pregnancy Never Smoker Emergency Contacts Name Discharge Info Relation Home Work Mobile Gwen Lin [17] [phone] Patient Belongings The following personal items are in your possession at time of discharge: 
  Dental Appliances: None  Visual Aid: None      Home Medications: Sent home   Jewelry: None  Clothing: At bedside    Other Valuables: Camera Please provide this summary of care documentation to your next provider. Signatures-by signing, you are acknowledging that this After Visit Summary has been reviewed with you and you have received a copy. Patient Signature:  ____________________________________________________________ Date:  ____________________________________________________________  
  
Melissa Irvinamento Provider Signature:  ____________________________________________________________ Date:  ____________________________________________________________

## 2018-05-07 NOTE — IP AVS SNAPSHOT
Summary of Care Report The Summary of Care report has been created to help improve care coordination. Users with access to [employer] or [facility] (Web-based application) may access additional patient information including the Discharge Summary. If you are not currently a [address] user and need more information, please call the number listed below in the [place] [address] section and ask to be connected with Medical Records. Facility Information Name Address Phone [address] [place] [address] [address] [place] [id number] [phone] Patient Information Patient Name Sex  Marina Mendoza ([id number]) Female 1990 Discharge Information Admitting Provider Service Area Unit Aj Escobar MD / Rose [phone] Mother Infant / [phone] Discharge Provider Discharge Date/Time Discharge Disposition Destination (none) 5/10/2018 Afternoon (Pending) AHR (none) Patient Language Language ENGLISH [13] Hospital Problems as of 2018  Reviewed: 2018  8:24 AM by Dc Marsh MD  
  
  
  
 Class Noted - Resolved Last Modified POA Active Problems Primigravida in third trimester  2018 - Present 2018 by Aj Escobar MD Unknown Entered by Aj Escobar MD  
  Normal labor  2018 - Present 2018 by Aj Escobar MD Unknown Entered by Aj Escobar MD  
  40 weeks gestation of pregnancy  2018 - Present 2018 by Aj Escobar MD Unknown Entered by Aj Escobar MD  
  
Non-Hospital Problems as of 2018  Reviewed: 2018  8:24 AM by Dc Marsh MD  
  
  
  
 Class Noted - Resolved Last Modified Active Problems   Multigravida in third trimester  2017 - Present 2018 by Aj Escobar MD  
 Entered by Amanda Nicole MD  
  Overview Addendum 1/15/2018 10:39 AM by Coco Pate NP  
   EDC by LMP confirmed by 7 3/7 week US Plans breastfeeding/mini pill Depression affecting pregnancy in first trimester, antepartum  9/21/2017 - Present 10/17/2017 by Amanda Nicole MD  
  Entered by Amanda Nicole MD  
  Overview Addendum 10/17/2017  3:21 PM by Amanda Nicole MD  
   Pt stopped all meds when found out preg Anemia in pregnancy, third trimester  2/14/2018 - Present 2/14/2018 by Amanda Nicole MD  
  Entered by Amanda Nicole MD  
  Overview Signed 2/14/2018  9:16 AM by Amanda Nicole MD  
   Additional Fe 
  
  
  Pruritus of pregnancy in third trimester  3/29/2018 - Present 3/29/2018 by Amanda Nicole MD  
  Entered by Amanda Nicole MD  
  Overview Signed 3/29/2018  2:46 PM by Amanda Nicole MD  
   noted GBS (group B Streptococcus carrier), +RV culture, currently pregnant  4/9/2018 - Present 4/9/2018 by Amanda Nicole MD  
  Entered by Amanda Nicole MD  
  Overview Signed 4/9/2018  8:39 AM by Amanda Nicole MD  
   Treat in labor You are allergic to the following No active allergies Current Discharge Medication List  
  
START taking these medications Dose & Instructions Dispensing Information Comments  
 ibuprofen 600 mg tablet Commonly known as:  MOTRIN Dose:  600 mg Take 1 Tab by mouth every six (6) hours as needed. Quantity:  60 Tab Refills:  1  
   
 norethindrone 0.35 mg Tab Commonly known as:  Abundio & Abundio Dose:  1 Tab Take 1 Tab by mouth daily. Quantity:  1 Package Refills:  12  
   
 oxyCODONE-acetaminophen 5-325 mg per tablet Commonly known as:  PERCOCET Dose:  1 Tab Take 1 Tab by mouth every four (4) hours as needed for Pain. Max Daily Amount: 6 Tabs. Quantity:  24 Tab Refills:  0 CONTINUE these medications which have NOT CHANGED Dose & Instructions Dispensing Information Comments Iron 325 mg (65 mg iron) tablet Generic drug:  ferrous sulfate Take  by mouth Daily (before breakfast). Refills:  0 STOOL SOFTENER 100 mg Tab Generic drug:  docusate sodium Take  by mouth. Refills:  0 JOSE RAMON PRENATAL 28 mg iron- 800 mcg Tab Generic drug:  prenatal vit-iron fumarate-fa Dose:  1 Tab Take 1 Tab by mouth daily. Refills:  0 Current Immunizations Name Date MMR  Deferred () Tdap 2018 Surgery Information ID Date/Time Status Primary Surgeon All Procedures Location [id number] 2018 Unposted   [facility] - DO NOT SCHEDULE    
 [id number] 2018 1800 Posted Ernesto Vazquez MD  [facility] L[facility]D Follow-up Information Follow up With Details Comments Contact Info None   None ([phone]) Patient stated that they have no PCP Discharge Instructions  Section: What to Expect at [facility] Your Recovery A  section, or , is surgery to deliver your baby through a cut, called an incision, that the doctor makes in your lower belly and uterus. You may have some pain in your lower belly and need pain medicine for 1 to 2 weeks. You can expect some vaginal bleeding for several weeks. You will probably need about 6 weeks to fully recover. It is important to take it easy while the incision is healing. Avoid heavy lifting, strenuous activities, or exercises that strain the belly muscles while you are recovering. Ask a family member or friend for help with housework, cooking, and shopping. This care sheet gives you a general idea about how long it will take for you to recover. But each person recovers at a different pace. Follow the steps below to get better as quickly as possible. How can you care for yourself at home? Activity ? · Rest when you feel tired. Getting enough sleep will help you recover. ? · Try to walk each day. Start by walking a little more than you did the day before. Bit by bit, increase the amount you walk. Walking boosts blood flow and helps prevent pneumonia, constipation, and blood clots. ? · Avoid strenuous activities, such as bicycle riding, jogging, weightlifting, and aerobic exercise, for 6 weeks or until your doctor says it is okay. ? · Until your doctor says it is okay, do not lift anything heavier than your baby. ? · Do not do sit-ups or other exercises that strain the belly muscles for 6 weeks or until your doctor says it is okay. ? · Hold a pillow over your incision when you cough or take deep breaths. This will support your belly and decrease your pain. ? · You may shower as usual. Pat the incision dry when you are done. ? · You will have some vaginal bleeding. Wear sanitary pads. Do not douche or use tampons until your doctor says it is okay. ? · Ask your doctor when you can drive again. ? · You will probably need to take at least 6 weeks off work. It depends on the type of work you do and how you feel. ? · Ask your doctor when it is okay for you to have sex. Diet ? · You can eat your normal diet. If your stomach is upset, try bland, low-fat foods like plain rice, broiled chicken, toast, and yogurt. ? · Drink plenty of fluids (unless your doctor tells you not to). ? · You may notice that your bowel movements are not regular right after your surgery. This is common. Try to avoid constipation and straining with bowel movements. You may want to take a fiber supplement every day. If you have not had a bowel movement after a couple of days, ask your doctor about taking a mild laxative. ? · If you are breastfeeding, do not drink any alcohol. Medicines ? · Your doctor will tell you if and when you can restart your medicines. He or she will also give you instructions about taking any new medicines. ? · If you take blood thinners, such as warfarin (Coumadin), clopidogrel (Plavix), or aspirin, be sure to talk to your doctor. He or she will tell you if and when to start taking those medicines again. Make sure that you understand exactly what your doctor wants you to do. ? · Take pain medicines exactly as directed. ¨ If the doctor gave you a prescription medicine for pain, take it as prescribed. ¨ If you are not taking a prescription pain medicine, ask your doctor if you can take an over-the-counter medicine. ? · If you think your pain medicine is making you sick to your stomach: 
¨ Take your medicine after meals (unless your doctor has told you not to). ¨ Ask your doctor for a different pain medicine. ? · If your doctor prescribed antibiotics, take them as directed. Do not stop taking them just because you feel better. You need to take the full course of antibiotics. Incision care ? · If you have strips of tape on the incision, leave the tape on for a week or until it falls off.  
? · Wash the area daily with warm, soapy water, and pat it dry. Don't use hydrogen peroxide or alcohol, which can slow healing. You may cover the area with a gauze bandage if it weeps or rubs against clothing. Change the bandage every day. ? · Keep the area clean and dry. Other instructions ? · If you breastfeed your baby, you may be more comfortable while you are healing if you place the baby so that he or she is not resting on your belly. Try tucking your baby under your arm, with his or her body along the side you will be feeding on. Support your baby's upper body with your arm. With that hand you can control your baby's head to bring his or her mouth to your breast. This is sometimes called the football hold. Follow-up care is a key part of your treatment and safety.  Be sure to make and go to all appointments, and call your doctor if you are having problems. It's also a good idea to know your test results and keep a list of the medicines you take. When should you call for help? Call 911 anytime you think you may need emergency care. For example, call if: 
? · You passed out (lost consciousness). ? · You have chest pain, are short of breath, or cough up blood. ?Call your doctor now or seek immediate medical care if: 
? · You have pain that does not get better after you take pain medicine. ? · You have severe vaginal bleeding. ? · You are dizzy or lightheaded, or you feel like you may faint. ? · You have new or worse pain in your belly or pelvis. ? · You have loose stitches, or your incision comes open. ? · You have symptoms of infection, such as: 
¨ Increased pain, swelling, warmth, or redness. ¨ Red streaks leading from the incision. ¨ Pus draining from the incision. ¨ A fever. ? · You have symptoms of a blood clot in your leg (called a deep vein thrombosis), such as: 
¨ Pain in your calf, back of the knee, thigh, or groin. ¨ Redness and swelling in your leg or groin. ? Watch closely for changes in your health, and be sure to contact your doctor if: 
? · You do not get better as expected. Where can you learn more? Go to http://da-marah.info/. Enter M806 in the search box to learn more about \" Section: What to Expect at Home. \" Current as of: 2017 Content Version: 11.4 © 8678-2699 [employer]. Care instructions adapted under license by [employer] (which disclaims liability or warranty for this information). If you have questions about a medical condition or this instruction, always ask your healthcare professional. [address] any warranty or liability for your use of this information. DISCHARGE SUMMARY from Nurse PATIENT INSTRUCTIONS: 
 
 After general anesthesia or intravenous sedation, for 24 hours or while taking prescription Narcotics: · Limit your activities · Do not drive and operate hazardous machinery · Do not make important personal or business decisions · Do  not drink alcoholic beverages · If you have not urinated within 8 hours after discharge, please contact your surgeon on call. Report the following to your surgeon: 
· Excessive pain, swelling, redness or odor of or around the surgical area · Temperature over 100.5 · Nausea and vomiting lasting longer than 4 hours or if unable to take medications · Any signs of decreased circulation or nerve impairment to extremity: change in color, persistent  numbness, tingling, coldness or increase pain · Any questions What to do at Home: *  Please give a list of your current medications to your Primary Care Provider. *  Please update this list whenever your medications are discontinued, doses are 
    changed, or new medications (including over-the-counter products) are added. *  Please carry medication information at all times in case of emergency situations. These are general instructions for a healthy lifestyle: No smoking/ No tobacco products/ Avoid exposure to second hand smoke Surgeon General's Warning:  Quitting smoking now greatly reduces serious risk to your health. Obesity, smoking, and sedentary lifestyle greatly increases your risk for illness A healthy diet, regular physical exercise & weight monitoring are important for maintaining a healthy lifestyle You may be retaining fluid if you have a history of heart failure or if you experience any of the following symptoms:  Weight gain of 3 pounds or more overnight or 5 pounds in a week, increased swelling in our hands or feet or shortness of breath while lying flat in bed. Please call your doctor as soon as you notice any of these symptoms; do not wait until your next office visit. Recognize signs and symptoms of STROKE: 
 
F-face looks uneven A-arms unable to move or move unevenly S-speech slurred or non-existent T-time-call 911 as soon as signs and symptoms begin-DO NOT go Back to bed or wait to see if you get better-TIME IS BRAIN. Warning Signs of HEART ATTACK Call 911 if you have these symptoms: 
? Chest discomfort. Most heart attacks involve discomfort in the center of the chest that lasts more than a few minutes, or that goes away and comes back. It can feel like uncomfortable pressure, squeezing, fullness, or pain. ? Discomfort in other areas of the upper body. Symptoms can include pain or discomfort in one or both arms, the back, neck, jaw, or stomach. ? Shortness of breath with or without chest discomfort. ? Other signs may include breaking out in a cold sweat, nausea, or lightheadedness. Don't wait more than five minutes to call [address]! Fast action can save your life. Calling 911 is almost always the fastest way to get lifesaving treatment. Emergency Medical Services staff can begin treatment when they arrive  up to an hour sooner than if someone gets to the hospital by car. The discharge information has been reviewed with the patient. The patient verbalized understanding. Discharge medications reviewed with the patient and appropriate educational materials and side effects teaching were provided. ___________________________________________________________________________________________________________________________________ Chart Review Routing History No Routing History on File

## 2018-05-08 ENCOUNTER — ANESTHESIA EVENT (OUTPATIENT)
Dept: LABOR AND DELIVERY | Age: 28
DRG: 540 | End: 2018-05-08
Payer: COMMERCIAL

## 2018-05-08 ENCOUNTER — ANESTHESIA (OUTPATIENT)
Dept: LABOR AND DELIVERY | Age: 28
DRG: 540 | End: 2018-05-08
Payer: COMMERCIAL

## 2018-05-08 PROBLEM — Z34.03 PRIMIGRAVIDA IN THIRD TRIMESTER: Status: ACTIVE | Noted: 2018-05-08

## 2018-05-08 LAB
ABO + RH BLD: NORMAL
BASE DEFICIT BLDCOA-SCNC: 1.9 MMOL/L (ref 0–2)
BASE DEFICIT BLDCOV-SCNC: 1.4 MMOL/L (ref 1.9–7.7)
BDY SITE: ABNORMAL
BDY SITE: ABNORMAL
BLOOD GROUP ANTIBODIES SERPL: NORMAL
HCO3 BLDCOA-SCNC: 26 MMOL/L (ref 22–26)
HCO3 BLDV-SCNC: 23 MMOL/L
PCO2 BLDCOA: 57 MMHG (ref 33–49)
PCO2 BLDCOV: 39 MMHG (ref 14.1–43.3)
PH BLDCOA: 7.28 [PH] (ref 7.21–7.31)
PH BLDCOV: 7.4 [PH] (ref 7.2–7.44)
PO2 BLDCOA: 15 MMHG (ref 9–19)
PO2 BLDV: 39 MMHG (ref 30.4–57.2)
SERVICE CMNT-IMP: ABNORMAL
SERVICE CMNT-IMP: ABNORMAL
SPECIMEN EXP DATE BLD: NORMAL

## 2018-05-08 PROCEDURE — 74011000250 HC RX REV CODE- 250: Performed by: ANESTHESIOLOGY

## 2018-05-08 PROCEDURE — 77030011640 HC PAD GRND REM COVD -A: Performed by: OBSTETRICS & GYNECOLOGY

## 2018-05-08 PROCEDURE — 36415 COLL VENOUS BLD VENIPUNCTURE: CPT | Performed by: OBSTETRICS & GYNECOLOGY

## 2018-05-08 PROCEDURE — 74011250636 HC RX REV CODE- 250/636

## 2018-05-08 PROCEDURE — 59510 CESAREAN DELIVERY: CPT | Performed by: OBSTETRICS & GYNECOLOGY

## 2018-05-08 PROCEDURE — 77030005518 HC CATH URETH FOL 2W BARD -B

## 2018-05-08 PROCEDURE — 77030018836 HC SOL IRR NACL ICUM -A: Performed by: OBSTETRICS & GYNECOLOGY

## 2018-05-08 PROCEDURE — 82803 BLOOD GASES ANY COMBINATION: CPT

## 2018-05-08 PROCEDURE — 74011250637 HC RX REV CODE- 250/637: Performed by: ANESTHESIOLOGY

## 2018-05-08 PROCEDURE — C1765 ADHESION BARRIER: HCPCS | Performed by: OBSTETRICS & GYNECOLOGY

## 2018-05-08 PROCEDURE — 77030032490 HC SLV COMPR SCD KNE COVD -B: Performed by: OBSTETRICS & GYNECOLOGY

## 2018-05-08 PROCEDURE — 10907ZC DRAINAGE OF AMNIOTIC FLUID, THERAPEUTIC FROM PRODUCTS OF CONCEPTION, VIA NATURAL OR ARTIFICIAL OPENING: ICD-10-PCS | Performed by: OBSTETRICS & GYNECOLOGY

## 2018-05-08 PROCEDURE — 4A1HXCZ MONITORING OF PRODUCTS OF CONCEPTION, CARDIAC RATE, EXTERNAL APPROACH: ICD-10-PCS | Performed by: OBSTETRICS & GYNECOLOGY

## 2018-05-08 PROCEDURE — 74011000250 HC RX REV CODE- 250

## 2018-05-08 PROCEDURE — 77030002933 HC SUT MCRYL J&J -A: Performed by: OBSTETRICS & GYNECOLOGY

## 2018-05-08 PROCEDURE — 77030018846 HC SOL IRR STRL H20 ICUM -A: Performed by: OBSTETRICS & GYNECOLOGY

## 2018-05-08 PROCEDURE — 65270000029 HC RM PRIVATE

## 2018-05-08 PROCEDURE — 75410000002 HC LABOR FEE PER 1 HR: Performed by: OBSTETRICS & GYNECOLOGY

## 2018-05-08 PROCEDURE — 77030014125 HC TY EPDRL BBMI -B: Performed by: ANESTHESIOLOGY

## 2018-05-08 PROCEDURE — 77030011943: Performed by: OBSTETRICS & GYNECOLOGY

## 2018-05-08 PROCEDURE — 76060000078 HC EPIDURAL ANESTHESIA: Performed by: OBSTETRICS & GYNECOLOGY

## 2018-05-08 PROCEDURE — 76010000391 HC C SECN FIRST 1 HR: Performed by: OBSTETRICS & GYNECOLOGY

## 2018-05-08 PROCEDURE — 77030002966 HC SUT PDS J&J -A: Performed by: OBSTETRICS & GYNECOLOGY

## 2018-05-08 PROCEDURE — 77030031139 HC SUT VCRL2 J&J -A: Performed by: OBSTETRICS & GYNECOLOGY

## 2018-05-08 PROCEDURE — 74011250636 HC RX REV CODE- 250/636: Performed by: ANESTHESIOLOGY

## 2018-05-08 PROCEDURE — 75410000003 HC RECOV DEL/VAG/CSECN EA 0.5 HR: Performed by: OBSTETRICS & GYNECOLOGY

## 2018-05-08 PROCEDURE — A4300 CATH IMPL VASC ACCESS PORTAL: HCPCS | Performed by: ANESTHESIOLOGY

## 2018-05-08 PROCEDURE — 77030010848 HC CATH INTUTR PRSS KOLB -B

## 2018-05-08 PROCEDURE — 74011250636 HC RX REV CODE- 250/636: Performed by: OBSTETRICS & GYNECOLOGY

## 2018-05-08 PROCEDURE — 74011258636 HC RX REV CODE- 258/636: Performed by: OBSTETRICS & GYNECOLOGY

## 2018-05-08 RX ORDER — SODIUM CHLORIDE, SODIUM LACTATE, POTASSIUM CHLORIDE, CALCIUM CHLORIDE 600; 310; 30; 20 MG/100ML; MG/100ML; MG/100ML; MG/100ML
50 INJECTION, SOLUTION INTRAVENOUS CONTINUOUS
Status: DISCONTINUED | OUTPATIENT
Start: 2018-05-08 | End: 2018-05-09

## 2018-05-08 RX ORDER — ACETAMINOPHEN 500 MG
1000 TABLET ORAL EVERY 6 HOURS
Status: DISCONTINUED | OUTPATIENT
Start: 2018-05-08 | End: 2018-05-09

## 2018-05-08 RX ORDER — HYDROMORPHONE HYDROCHLORIDE 2 MG/ML
1 INJECTION, SOLUTION INTRAMUSCULAR; INTRAVENOUS; SUBCUTANEOUS
Status: DISCONTINUED | OUTPATIENT
Start: 2018-05-08 | End: 2018-05-09

## 2018-05-08 RX ORDER — PHENYLEPHRINE HYDROCHLORIDE 10 MG/ML
INJECTION INTRAVENOUS AS NEEDED
Status: DISCONTINUED | OUTPATIENT
Start: 2018-05-08 | End: 2018-05-08 | Stop reason: HOSPADM

## 2018-05-08 RX ORDER — NALBUPHINE HYDROCHLORIDE 10 MG/ML
5 INJECTION, SOLUTION INTRAMUSCULAR; INTRAVENOUS; SUBCUTANEOUS
Status: DISCONTINUED | OUTPATIENT
Start: 2018-05-08 | End: 2018-05-09

## 2018-05-08 RX ORDER — MORPHINE SULFATE 0.5 MG/ML
INJECTION, SOLUTION EPIDURAL; INTRATHECAL; INTRAVENOUS AS NEEDED
Status: DISCONTINUED | OUTPATIENT
Start: 2018-05-08 | End: 2018-05-08 | Stop reason: HOSPADM

## 2018-05-08 RX ORDER — DEXTROSE, SODIUM CHLORIDE, SODIUM LACTATE, POTASSIUM CHLORIDE, AND CALCIUM CHLORIDE 5; .6; .31; .03; .02 G/100ML; G/100ML; G/100ML; G/100ML; G/100ML
125 INJECTION, SOLUTION INTRAVENOUS CONTINUOUS
Status: DISCONTINUED | OUTPATIENT
Start: 2018-05-08 | End: 2018-05-09

## 2018-05-08 RX ORDER — SODIUM CHLORIDE 0.9 % (FLUSH) 0.9 %
5-10 SYRINGE (ML) INJECTION EVERY 8 HOURS
Status: DISCONTINUED | OUTPATIENT
Start: 2018-05-08 | End: 2018-05-09

## 2018-05-08 RX ORDER — SODIUM CHLORIDE, SODIUM LACTATE, POTASSIUM CHLORIDE, CALCIUM CHLORIDE 600; 310; 30; 20 MG/100ML; MG/100ML; MG/100ML; MG/100ML
INJECTION, SOLUTION INTRAVENOUS
Status: DISCONTINUED | OUTPATIENT
Start: 2018-05-08 | End: 2018-05-08 | Stop reason: HOSPADM

## 2018-05-08 RX ORDER — ROPIVACAINE HYDROCHLORIDE 2 MG/ML
INJECTION, SOLUTION EPIDURAL; INFILTRATION; PERINEURAL
Status: DISCONTINUED | OUTPATIENT
Start: 2018-05-08 | End: 2018-05-08 | Stop reason: HOSPADM

## 2018-05-08 RX ORDER — LIDOCAINE HYDROCHLORIDE 20 MG/ML
INJECTION, SOLUTION EPIDURAL; INFILTRATION; INTRACAUDAL; PERINEURAL AS NEEDED
Status: DISCONTINUED | OUTPATIENT
Start: 2018-05-08 | End: 2018-05-08 | Stop reason: HOSPADM

## 2018-05-08 RX ORDER — OXYTOCIN/RINGER'S LACTATE 30/500 ML
PLASTIC BAG, INJECTION (ML) INTRAVENOUS
Status: DISCONTINUED | OUTPATIENT
Start: 2018-05-08 | End: 2018-05-08 | Stop reason: HOSPADM

## 2018-05-08 RX ORDER — TRISODIUM CITRATE DIHYDRATE AND CITRIC ACID MONOHYDRATE 500; 334 MG/5ML; MG/5ML
30 SOLUTION ORAL
Status: COMPLETED | OUTPATIENT
Start: 2018-05-08 | End: 2018-05-08

## 2018-05-08 RX ORDER — ONDANSETRON 2 MG/ML
INJECTION INTRAMUSCULAR; INTRAVENOUS AS NEEDED
Status: DISCONTINUED | OUTPATIENT
Start: 2018-05-08 | End: 2018-05-08 | Stop reason: HOSPADM

## 2018-05-08 RX ORDER — NALOXONE HYDROCHLORIDE 0.4 MG/ML
0.2 INJECTION, SOLUTION INTRAMUSCULAR; INTRAVENOUS; SUBCUTANEOUS
Status: DISCONTINUED | OUTPATIENT
Start: 2018-05-08 | End: 2018-05-09

## 2018-05-08 RX ORDER — KETOROLAC TROMETHAMINE 30 MG/ML
30 INJECTION, SOLUTION INTRAMUSCULAR; INTRAVENOUS EVERY 6 HOURS
Status: COMPLETED | OUTPATIENT
Start: 2018-05-08 | End: 2018-05-09

## 2018-05-08 RX ORDER — OXYCODONE HYDROCHLORIDE 5 MG/1
10 TABLET ORAL
Status: DISCONTINUED | OUTPATIENT
Start: 2018-05-08 | End: 2018-05-09

## 2018-05-08 RX ORDER — ONDANSETRON 2 MG/ML
4 INJECTION INTRAMUSCULAR; INTRAVENOUS
Status: DISCONTINUED | OUTPATIENT
Start: 2018-05-08 | End: 2018-05-11 | Stop reason: HOSPADM

## 2018-05-08 RX ORDER — CEFAZOLIN SODIUM/WATER 2 G/20 ML
2 SYRINGE (ML) INTRAVENOUS ONCE
Status: DISPENSED | OUTPATIENT
Start: 2018-05-08 | End: 2018-05-09

## 2018-05-08 RX ORDER — SODIUM CHLORIDE 0.9 % (FLUSH) 0.9 %
5-10 SYRINGE (ML) INJECTION AS NEEDED
Status: DISCONTINUED | OUTPATIENT
Start: 2018-05-08 | End: 2018-05-09

## 2018-05-08 RX ORDER — OXYTOCIN/0.9 % SODIUM CHLORIDE 15/250 ML
250 PLASTIC BAG, INJECTION (ML) INTRAVENOUS ONCE
Status: ACTIVE | OUTPATIENT
Start: 2018-05-08 | End: 2018-05-09

## 2018-05-08 RX ORDER — LIDOCAINE HYDROCHLORIDE AND EPINEPHRINE 15; 5 MG/ML; UG/ML
INJECTION, SOLUTION EPIDURAL AS NEEDED
Status: DISCONTINUED | OUTPATIENT
Start: 2018-05-08 | End: 2018-05-08 | Stop reason: HOSPADM

## 2018-05-08 RX ORDER — LIDOCAINE HYDROCHLORIDE AND EPINEPHRINE 20; 5 MG/ML; UG/ML
INJECTION, SOLUTION EPIDURAL; INFILTRATION; INTRACAUDAL; PERINEURAL AS NEEDED
Status: DISCONTINUED | OUTPATIENT
Start: 2018-05-08 | End: 2018-05-08 | Stop reason: HOSPADM

## 2018-05-08 RX ADMIN — PENICILLIN G POTASSIUM 2.5 MILLION UNITS: 20000000 POWDER, FOR SOLUTION INTRAVENOUS at 03:31

## 2018-05-08 RX ADMIN — LIDOCAINE HYDROCHLORIDE AND EPINEPHRINE 5 ML: 15; 5 INJECTION, SOLUTION EPIDURAL at 09:36

## 2018-05-08 RX ADMIN — LIDOCAINE HYDROCHLORIDE AND EPINEPHRINE 5 ML: 20; 5 INJECTION, SOLUTION EPIDURAL; INFILTRATION; INTRACAUDAL; PERINEURAL at 18:11

## 2018-05-08 RX ADMIN — SODIUM CHLORIDE, SODIUM LACTATE, POTASSIUM CHLORIDE, AND CALCIUM CHLORIDE 50 ML/HR: 600; 310; 30; 20 INJECTION, SOLUTION INTRAVENOUS at 23:29

## 2018-05-08 RX ADMIN — LIDOCAINE HYDROCHLORIDE 5 ML: 20 INJECTION, SOLUTION EPIDURAL; INFILTRATION; INTRACAUDAL; PERINEURAL at 09:41

## 2018-05-08 RX ADMIN — LIDOCAINE HYDROCHLORIDE AND EPINEPHRINE 5 ML: 20; 5 INJECTION, SOLUTION EPIDURAL; INFILTRATION; INTRACAUDAL; PERINEURAL at 18:12

## 2018-05-08 RX ADMIN — PHENYLEPHRINE HYDROCHLORIDE 100 MCG: 10 INJECTION INTRAVENOUS at 18:30

## 2018-05-08 RX ADMIN — HYDROMORPHONE HYDROCHLORIDE 1 MG: 2 INJECTION, SOLUTION INTRAMUSCULAR; INTRAVENOUS; SUBCUTANEOUS at 19:17

## 2018-05-08 RX ADMIN — OXYTOCIN 6 MILLI-UNITS/MIN: 10 INJECTION, SOLUTION INTRAMUSCULAR; INTRAVENOUS at 09:08

## 2018-05-08 RX ADMIN — PENICILLIN G POTASSIUM 2.5 MILLION UNITS: 20000000 POWDER, FOR SOLUTION INTRAVENOUS at 07:32

## 2018-05-08 RX ADMIN — MORPHINE SULFATE 5 MG: 10 INJECTION INTRAMUSCULAR; INTRAVENOUS; SUBCUTANEOUS at 03:30

## 2018-05-08 RX ADMIN — Medication 500 ML/HR: at 18:29

## 2018-05-08 RX ADMIN — PENICILLIN G POTASSIUM 2.5 MILLION UNITS: 20000000 POWDER, FOR SOLUTION INTRAVENOUS at 11:42

## 2018-05-08 RX ADMIN — PHENYLEPHRINE HYDROCHLORIDE 100 MCG: 10 INJECTION INTRAVENOUS at 18:20

## 2018-05-08 RX ADMIN — SODIUM CHLORIDE, SODIUM LACTATE, POTASSIUM CHLORIDE, CALCIUM CHLORIDE, AND DEXTROSE MONOHYDRATE 125 ML/HR: 600; 310; 30; 20; 5 INJECTION, SOLUTION INTRAVENOUS at 09:53

## 2018-05-08 RX ADMIN — ONDANSETRON 4 MG: 2 INJECTION INTRAMUSCULAR; INTRAVENOUS at 18:35

## 2018-05-08 RX ADMIN — ROPIVACAINE HYDROCHLORIDE: 2 INJECTION, SOLUTION EPIDURAL; INFILTRATION; PERINEURAL at 16:15

## 2018-05-08 RX ADMIN — OXYTOCIN 2 MILLI-UNITS/MIN: 10 INJECTION, SOLUTION INTRAMUSCULAR; INTRAVENOUS at 07:12

## 2018-05-08 RX ADMIN — PENICILLIN G POTASSIUM 2.5 MILLION UNITS: 20000000 POWDER, FOR SOLUTION INTRAVENOUS at 15:38

## 2018-05-08 RX ADMIN — SODIUM CITRATE AND CITRIC ACID MONOHYDRATE 30 ML: 500; 334 SOLUTION ORAL at 18:05

## 2018-05-08 RX ADMIN — LIDOCAINE HYDROCHLORIDE AND EPINEPHRINE 5 ML: 20; 5 INJECTION, SOLUTION EPIDURAL; INFILTRATION; INTRACAUDAL; PERINEURAL at 18:13

## 2018-05-08 RX ADMIN — SODIUM CHLORIDE, SODIUM LACTATE, POTASSIUM CHLORIDE, CALCIUM CHLORIDE: 600; 310; 30; 20 INJECTION, SOLUTION INTRAVENOUS at 18:13

## 2018-05-08 RX ADMIN — NALBUPHINE HYDROCHLORIDE 5 MG: 10 INJECTION, SOLUTION INTRAMUSCULAR; INTRAVENOUS; SUBCUTANEOUS at 21:38

## 2018-05-08 RX ADMIN — ROPIVACAINE HYDROCHLORIDE 10 ML/HR: 2 INJECTION, SOLUTION EPIDURAL; INFILTRATION; PERINEURAL at 09:45

## 2018-05-08 RX ADMIN — MORPHINE SULFATE 5 MG: 10 INJECTION INTRAMUSCULAR; INTRAVENOUS; SUBCUTANEOUS at 01:00

## 2018-05-08 RX ADMIN — ACETAMINOPHEN 1000 MG: 500 TABLET, FILM COATED ORAL at 21:37

## 2018-05-08 RX ADMIN — MORPHINE SULFATE 3.5 MG: 0.5 INJECTION, SOLUTION EPIDURAL; INTRATHECAL; INTRAVENOUS at 18:36

## 2018-05-08 RX ADMIN — KETOROLAC TROMETHAMINE 30 MG: 30 INJECTION, SOLUTION INTRAMUSCULAR at 19:17

## 2018-05-08 RX ADMIN — SODIUM CHLORIDE 12.5 MG: 9 INJECTION INTRAMUSCULAR; INTRAVENOUS; SUBCUTANEOUS at 19:17

## 2018-05-08 RX ADMIN — SODIUM CHLORIDE, SODIUM LACTATE, POTASSIUM CHLORIDE, CALCIUM CHLORIDE: 600; 310; 30; 20 INJECTION, SOLUTION INTRAVENOUS at 18:55

## 2018-05-08 NOTE — PROGRESS NOTES
Report given to [facility] by [employer] and care relinquished.

## 2018-05-08 NOTE — OP NOTES
Leanne An  1990    Preop Dx:   1. IUP @ 40 5/7 weeks gestation   2. Failed induction --- NR-FHTs    Postop Dx: Same    Procedure: Primary LTCS    Surgeon: Kristin Mcclellan      Anesthesia: epidural    EBL: 600 mL  IVF: 1000 mL  UOP: 595 mL    Complications: None    Findings:  Viable Female infant. Vtx position. APGARS 8,9.  Weight:  6 lbs, 13 oz. Nuchal cord x 2, manually reduced. Normal appearing uterus, tubes and ovaries. Procedure:  Pt was taken to the operating room where epidural anesthesia was found to be adequate. She was then prepped and draped in the usual sterile fashion and placed in the supine position with a leftward tilt. A Pfannenstiel skin incision was made with the scalpel and carried down to the underlying layer of fascia with the bovie. The fascia was incised in the midline and the incision extended laterally with the kelly scissors. Superior of the fascial incision was grasped with Kocher clamps and  from the rectus muscles sharply and bluntly. In a similar fashion, the inferior aspect of the fascial incision was grasped with the Kocher clamps and  from the rectus muscles sharply and bluntly. The rectus muscles were  in the midline bluntly and peritoneum entered bluntly. The peritoneal incision was extended manually. Bladder blade was inserted and lower uterine incision made with the scalpel. Incision extended manually laterally. Infants head delivered atraumatically. Nose and mouth suctioned. Cord clamped and cut. Infant handed off to the waiting NICU staff. The uterus was exteriorized and cleared of all clots and debris. Hysterotomy was closed with 0-vicryl in a running, locking fashion. A second layer of 0-vicryl was placed in a running fashion to imbricate the incision. The pelvis and gutters were cleared of all clots and debris, the uterus returned to the abdomen and hemostasis was assured throughout.   Intercede was placed over the hysterotomy site.  Fascial incision repaired with 0-PDS in a running fashion. Subcutaneous tissue was cleared of all clots and debris and hemostasis assured. Subcutaneous tissue reapproximated with 3-0 vicryl rapide in a running fashion. Skin closed with 4-0 monocryl in a subcuticular fashion. Pt tolerated procedure well. Sponge, lap and needle counts correct x 3. Disposition: Pt to RR stable and infant to  nursery stable.     Pathology: cords gases      Ernesto Gutierrez MD  6:56 PM  18

## 2018-05-08 NOTE — PROGRESS NOTES
Subjective: Pt tolerating labor well.     Objective:    Vitals:    18 1024 18 1028 18 1034 18 1039   BP: 108/57 120/66 118/77 118/67   Pulse: 80 83 76 85   Temp:    98.1 °F (36.7 °C)       FHT's:  Baseline -130's, reactive  [place]:  irreg ctx q 2-5 min    SVE:  480/-1    Assessement and Plan: Leanne An 24 y.o.  at 40w5d admitted for postdates IOL    Continue pitocin augmentation     Pain control: epidural    GBS: POS - being treated      Khai Batista MD    11:31 AM    18

## 2018-05-08 NOTE — PROGRESS NOTES
Subjective: Pt tolerating labor well.     Objective:    Vitals:    18 1510 18 1526 18 1540 18 1548   BP: 111/72 107/58 104/59    Pulse: 71 72 75    Temp:    97.7 °F (36.5 °C)       FHT's:  Baseline -140's, reactive, occ decels with good BTB/RTB  [place]:  Ctx q 2-4 min    SVE:  5/-1    Assessement and Plan: Leanne An 24 y.o.  at 40w5d admitted for postdates IOL    IUPC, FSE placed in usual fashion without difficulty    Lengthy D/W about labor curves, cx change, FHTs, etc.    Continue pitocin augmentation     Pain control: epidural    GBS: POS - being treated      Warden Kb MD    4:48 PM    18

## 2018-05-08 NOTE — ANESTHESIA PREPROCEDURE EVALUATION
Anesthetic History   No history of anesthetic complications            Review of Systems / Medical History  Patient summary reviewed, nursing notes reviewed and pertinent labs reviewed    Pulmonary  Within defined limits                 Neuro/Psych         Psychiatric history     Cardiovascular  Within defined limits                Exercise tolerance: >4 METS     GI/Hepatic/Renal  Within defined limits              Endo/Other        Obesity     Other Findings              Physical Exam    Airway  Mallampati: II      Mouth opening: Normal     Cardiovascular  Regular rate and rhythm,  S1 and S2 normal,  no murmur, click, rub, or gallop             Dental  No notable dental hx       Pulmonary  Breath sounds clear to auscultation               Abdominal         Other Findings            Anesthetic Plan    ASA: 2  Anesthesia type: epidural            Anesthetic plan and risks discussed with: Patient and Family

## 2018-05-08 NOTE — PROGRESS NOTES
Assumed care of patient. SBAR report received. Pt seems anxious. Answered questions and offered reassurance.

## 2018-05-08 NOTE — PROGRESS NOTES
Pt on side. Adjusting monitors. Machine is halving the tracing.  Although tracing is in 60s audible in the room a@ 120

## 2018-05-08 NOTE — PROGRESS NOTES
Pt complained of severe low back pain and discomfort with sudden onset. Morphine 5 mg ivp slowly given.

## 2018-05-08 NOTE — PROGRESS NOTES
Pt placed on the peanut. Left tilt position. States she is comfortable. [place] draining to gravity. Clear urine.

## 2018-05-08 NOTE — PROGRESS NOTES
FHR with variable decels from aprox [phone] during that time, maternal position change from Rl to L tilt, oxytocin decreased from 10mu to 5mu at 1140.

## 2018-05-08 NOTE — H&P
[facility] H&P      Assessment/Plan    Problem List  Date Reviewed: 3/27/2018          Codes Class    Primigravida in third trimester ICD-10-CM: Z34.03  ICD-9-CM: V22.0         GBS (group B Streptococcus carrier), +RV culture, currently pregnant ICD-10-CM: O99.820  ICD-9-CM: V23.89, V02.51     Overview Signed 2018  8:39 AM by Lidia Crane MD     Treat in labor             Pruritus of pregnancy in third trimester ICD-10-CM: O99.713, L29.9  ICD-9-CM: 646.83, 698.9     Overview Signed 3/29/2018  2:46 PM by Lidia Crane MD     noted             Anemia in pregnancy, third trimester ICD-10-CM: O99.013  ICD-9-CM: 648.23     Overview Signed 2018  9:16 AM by Lidia Crane MD     Additional Fe             Multigravida in third trimester ICD-10-CM: Z34.83  ICD-9-CM: V22.1     Overview Addendum 1/15/2018 10:39 AM by Christopher Crump NP     EDC by LMP confirmed by 7 3/7 week US  Plans breastfeeding/mini pill             Depression affecting pregnancy in first trimester, antepartum ICD-10-CM: O99.341, F32.9  ICD-9-CM: 648.43, 311     Overview Addendum 10/17/2017  3:21 PM by Lidia Crane MD     Pt stopped all meds when found out preg                   Subjective    Leanne White 24 y.o.  40w5d  presented to L&D for postdates IOL. Pt has no complaints today. Pt denies vaginal bleeding/discharge. No LOF.  +FM. OB History    Para Term  AB Living   3    2 0   SAB TAB Ectopic Molar Multiple Live Births        0      # Outcome Date GA Lbr Dameon/2nd Weight Sex Delivery Anes PTL Lv   3 Current            2 AB            1 AB                   Past Medical History:   Diagnosis Date    Psychiatric disorder     depression       No past surgical history on file.     Family History   Problem Relation Age of Onset   Pancho Prior Migraines Mother     No Known Problems Father        Social History     Social History    Marital status: SINGLE     Spouse name: N/A    Number of children: N/A    Years of education: N/A     Occupational History    Not on file. Social History Main Topics    Smoking status: Never Smoker    Smokeless tobacco: Never Used    Alcohol use No      Comment: stopped when known pregnancy    Drug use: No    Sexual activity: Yes     Partners: Male     Birth control/ protection: None     Other Topics Concern    Caffeine Concern No    Exercise Yes    Seat Belt Yes    Self-Exams No     Social History Narrative    Abuse: Feels safe at home, no history of physical abuse, no history of sexual abuse           No Known Allergies      Review of Systems:    Constitutional: No fevers or chills     Prenatal: + fetal movement, no VB/DC, no LOF     CV: No chest pain or palpatations    Resp: No SOB or cough    GI: No nausea/vomiting/diarrhea/constipation    Neuro: No HA, no seizure like activity    Skin: No rashes or lesions     Breast: No breast pain    : No dysuria or hematuria      Prenatal Record Review    The prenatal record has been reviewed.     Prenatal Labs:   Lab Results   Component Value Date/Time    Rubella, External immune 10/07/2017    GrBStrep, External positive 04/02/2018    HBsAg, External negative 10/07/2017    HIV, External nonreactive 10/07/2017    RPR, External non-reactive 10/07/2017    Gonorrhea, External negative 10/07/2017    Chlamydia, External negative 10/07/2017       Objective    Visit Vitals    /63    Pulse 72    Temp 98.1 °F (36.7 °C)    LMP 07/27/2017 (Exact Date)    Breastfeeding Yes         Obstetric Exam    SVE: 2/50/-3    Physical Exam    Gen: alert and cooperative, NAD    HEENT: NCAT    CV: RRR    RESP: CTA bilat    ABD: Gravid, soft, NT    EXT: trace edema bilat    NEURO: No focal deficits    SKIN: No noted rashes or lesions       Madison Martinez MD  8:15 AM  05/08/18

## 2018-05-08 NOTE — PROGRESS NOTES
Subjective: Pt tolerating labor well. Objective:    Vitals:    18 1510 18 1526 18 1540 18 1548   BP: 111/72 107/58 104/59    Pulse: 71 72 75    Temp:    97.7 °F (36.5 °C)       FHT's:  Baseline 's, decreased variability and late decels with each contractions over the last 10-15 min  [place]:  Ctx q 2-3 min    SVE:  No change    Assessement and Plan: Leanne An 24 y.o.  at 40w5d admitted for postdates IOL    Lengthy D/W pt about NR-FHTs and arrest of dilation - REC proceeding with   D/W pt at length risks/benefits of procedure including but not limited to death, bleeding, infection and damage to other internal organs. She exhibited full understanding and wishes to proceed.       Pain control: epidural    GBS: POS      Shira Norton MD    5:55 PM    18

## 2018-05-08 NOTE — PROGRESS NOTES
Subjective: Pt tolerating induction/labor well.     Objective:    Vitals:    18 0428 18 0528 18 0641 18 0720   BP: 92/54 90/55 107/63    Pulse: 75 72     Temp:   98.7 °F (37.1 °C) 98.1 °F (36.7 °C)       FHT's:  Baseline 's, reactive  [place]:  irreg ctx    SVE:  2-3/50/-3  Membranes:  AROM - clear    Assessement and Plan: Leanne An 24 y.o.  at 40w5d admitted for postdates IOL    Continue pitocin augmentation     Pain control: none    GBS: POS - being treated      Aashish Campos MD    8:28 AM    18

## 2018-05-08 NOTE — PROGRESS NOTES
1640 Dr. Mark Lomas in room to place IUPC and FSE for assessment of contraction strength and fetal status. Pt tolerated well. Pt turned to her left lateral position and placed on peanut. Oxygen was placed on and IVF bolus given. Pitocin halved.

## 2018-05-08 NOTE — PROGRESS NOTES
Loss of  of FHR and ctx while pt getting epidural due to maternal habitus and pt position needed for epidural

## 2018-05-08 NOTE — PROGRESS NOTES
As pt requested epidrual and per Jeffrey Maria pt may get epidural, Nancy Lopez Md from anesthesia in room

## 2018-05-08 NOTE — ANESTHESIA PROCEDURE NOTES
Epidural Block    Start time: 5/8/2018 9:30 AM  End time: 5/8/2018 9:40 AM  Performed by: Brenton Turner by: Jen Leal     Pre-Procedure  Indication: labor epidural    Preanesthetic Checklist: patient identified, risks and benefits discussed, anesthesia consent, patient being monitored, timeout performed and anesthesia consent    Timeout Time: 09:30        Epidural:   Patient position:  Seated  Prep region:  Lumbar  Prep: Chlorhexidine    Location:  L3-4    Needle and Epidural Catheter:   Needle Type:  Tuohy  Needle Gauge:  17 G  Injection Technique:  Loss of resistance using air  Attempts:  1  Catheter Size:  19 G  Catheter at Skin Depth (cm):  12.5  Depth in Epidural Space (cm):  5  Events: no blood with aspiration, no cerebrospinal fluid with aspiration, no paresthesia and negative aspiration test    Test Dose:  Lidocaine 1.5% w/ epi and negative    Assessment:   Catheter Secured:  Tape and tegaderm  Insertion:  Uncomplicated  Patient tolerance:  Patient tolerated the procedure well with no immediate complications

## 2018-05-09 PROBLEM — Z37.9 NORMAL LABOR: Status: ACTIVE | Noted: 2018-05-09

## 2018-05-09 PROBLEM — Z3A.40 40 WEEKS GESTATION OF PREGNANCY: Status: ACTIVE | Noted: 2018-05-09

## 2018-05-09 LAB
ERYTHROCYTE [DISTWIDTH] IN BLOOD BY AUTOMATED COUNT: 14.6 % (ref 11.9–14.6)
HCT VFR BLD AUTO: 27.5 % (ref 35.8–46.3)
HGB BLD-MCNC: 8.6 G/DL (ref 11.7–15.4)
MCH RBC QN AUTO: 28.1 PG (ref 26.1–32.9)
MCHC RBC AUTO-ENTMCNC: 31.3 G/DL (ref 31.4–35)
MCV RBC AUTO: 89.9 FL (ref 79.6–97.8)
PLATELET # BLD AUTO: 287 K/UL (ref 150–450)
PMV BLD AUTO: 9.1 FL (ref 10.8–14.1)
RBC # BLD AUTO: 3.06 M/UL (ref 4.05–5.25)
WBC # BLD AUTO: 13.8 K/UL (ref 4.3–11.1)

## 2018-05-09 PROCEDURE — 74011250636 HC RX REV CODE- 250/636: Performed by: ANESTHESIOLOGY

## 2018-05-09 PROCEDURE — 74011250637 HC RX REV CODE- 250/637: Performed by: ANESTHESIOLOGY

## 2018-05-09 PROCEDURE — 65270000029 HC RM PRIVATE

## 2018-05-09 PROCEDURE — 36415 COLL VENOUS BLD VENIPUNCTURE: CPT | Performed by: OBSTETRICS & GYNECOLOGY

## 2018-05-09 PROCEDURE — 85027 COMPLETE CBC AUTOMATED: CPT | Performed by: OBSTETRICS & GYNECOLOGY

## 2018-05-09 RX ORDER — LANOLIN ALCOHOL/MO/W.PET/CERES
1 CREAM (GRAM) TOPICAL 2 TIMES DAILY WITH MEALS
Status: DISCONTINUED | OUTPATIENT
Start: 2018-05-09 | End: 2018-05-11 | Stop reason: HOSPADM

## 2018-05-09 RX ORDER — DIPHENHYDRAMINE HCL 25 MG
25 CAPSULE ORAL
Status: DISCONTINUED | OUTPATIENT
Start: 2018-05-09 | End: 2018-05-11 | Stop reason: HOSPADM

## 2018-05-09 RX ORDER — DOCUSATE SODIUM 100 MG/1
100 CAPSULE, LIQUID FILLED ORAL
Status: DISCONTINUED | OUTPATIENT
Start: 2018-05-09 | End: 2018-05-09 | Stop reason: SDUPTHER

## 2018-05-09 RX ORDER — OXYCODONE HYDROCHLORIDE 5 MG/1
10 TABLET ORAL
Status: DISCONTINUED | OUTPATIENT
Start: 2018-05-09 | End: 2018-05-11 | Stop reason: HOSPADM

## 2018-05-09 RX ORDER — SODIUM CHLORIDE 0.9 % (FLUSH) 0.9 %
5-10 SYRINGE (ML) INJECTION AS NEEDED
Status: DISCONTINUED | OUTPATIENT
Start: 2018-05-09 | End: 2018-05-11 | Stop reason: HOSPADM

## 2018-05-09 RX ORDER — DOCUSATE SODIUM 100 MG/1
100 CAPSULE, LIQUID FILLED ORAL 2 TIMES DAILY
Status: DISCONTINUED | OUTPATIENT
Start: 2018-05-09 | End: 2018-05-11 | Stop reason: HOSPADM

## 2018-05-09 RX ORDER — SODIUM CHLORIDE 0.9 % (FLUSH) 0.9 %
5-10 SYRINGE (ML) INJECTION EVERY 8 HOURS
Status: DISCONTINUED | OUTPATIENT
Start: 2018-05-09 | End: 2018-05-11 | Stop reason: HOSPADM

## 2018-05-09 RX ORDER — ZOLPIDEM TARTRATE 5 MG/1
5 TABLET ORAL
Status: DISCONTINUED | OUTPATIENT
Start: 2018-05-09 | End: 2018-05-11 | Stop reason: HOSPADM

## 2018-05-09 RX ORDER — SIMETHICONE 80 MG
80 TABLET,CHEWABLE ORAL
Status: DISCONTINUED | OUTPATIENT
Start: 2018-05-09 | End: 2018-05-11 | Stop reason: HOSPADM

## 2018-05-09 RX ORDER — MORPHINE SULFATE 10 MG/ML
5 INJECTION, SOLUTION INTRAMUSCULAR; INTRAVENOUS
Status: DISCONTINUED | OUTPATIENT
Start: 2018-05-09 | End: 2018-05-11 | Stop reason: HOSPADM

## 2018-05-09 RX ORDER — OXYTOCIN/0.9 % SODIUM CHLORIDE 15/250 ML
250 PLASTIC BAG, INJECTION (ML) INTRAVENOUS ONCE
Status: ACTIVE | OUTPATIENT
Start: 2018-05-09 | End: 2018-05-10

## 2018-05-09 RX ORDER — OXYCODONE HYDROCHLORIDE 5 MG/1
5 TABLET ORAL
Status: DISCONTINUED | OUTPATIENT
Start: 2018-05-09 | End: 2018-05-11 | Stop reason: HOSPADM

## 2018-05-09 RX ORDER — PRENATAL VIT 96/IRON FUM/FOLIC 27MG-0.8MG
1 TABLET ORAL DAILY
Status: DISCONTINUED | OUTPATIENT
Start: 2018-05-10 | End: 2018-05-09 | Stop reason: SDUPTHER

## 2018-05-09 RX ORDER — PRENATAL VIT 96/IRON FUM/FOLIC 27MG-0.8MG
1 TABLET ORAL DAILY
Status: DISCONTINUED | OUTPATIENT
Start: 2018-05-10 | End: 2018-05-11 | Stop reason: HOSPADM

## 2018-05-09 RX ORDER — SODIUM CHLORIDE, SODIUM LACTATE, POTASSIUM CHLORIDE, CALCIUM CHLORIDE 600; 310; 30; 20 MG/100ML; MG/100ML; MG/100ML; MG/100ML
150 INJECTION, SOLUTION INTRAVENOUS CONTINUOUS
Status: DISCONTINUED | OUTPATIENT
Start: 2018-05-09 | End: 2018-05-11 | Stop reason: HOSPADM

## 2018-05-09 RX ORDER — IBUPROFEN 600 MG/1
600 TABLET ORAL
Status: DISCONTINUED | OUTPATIENT
Start: 2018-05-09 | End: 2018-05-11 | Stop reason: HOSPADM

## 2018-05-09 RX ADMIN — KETOROLAC TROMETHAMINE 30 MG: 30 INJECTION, SOLUTION INTRAMUSCULAR at 03:28

## 2018-05-09 RX ADMIN — Medication 10 ML: at 17:28

## 2018-05-09 RX ADMIN — KETOROLAC TROMETHAMINE 30 MG: 30 INJECTION, SOLUTION INTRAMUSCULAR at 10:41

## 2018-05-09 RX ADMIN — OXYCODONE HYDROCHLORIDE 10 MG: 5 TABLET ORAL at 05:26

## 2018-05-09 RX ADMIN — Medication 10 ML: at 03:28

## 2018-05-09 RX ADMIN — ACETAMINOPHEN 1000 MG: 500 TABLET, FILM COATED ORAL at 17:28

## 2018-05-09 RX ADMIN — ACETAMINOPHEN 1000 MG: 500 TABLET, FILM COATED ORAL at 10:41

## 2018-05-09 RX ADMIN — KETOROLAC TROMETHAMINE 30 MG: 30 INJECTION, SOLUTION INTRAMUSCULAR at 17:28

## 2018-05-09 RX ADMIN — ACETAMINOPHEN 1000 MG: 500 TABLET, FILM COATED ORAL at 03:28

## 2018-05-09 NOTE — PROGRESS NOTES
Shift assessment complete. Pt resting in bed at this time. Denies pain, or needs at this time. Questions encouraged and answered. Encouraged to call for needs or concerns. Verbalized understanding.

## 2018-05-09 NOTE — PROGRESS NOTES
SBAR IN Report: Mother    Verbal report received from Ajit Vasques RN (full name & credentials) on this patient, who is now being transferred from L&D (unit) for routine progression of care. The patient is wearing a green \"Anesthesia-Duramorph\" band. Report consisted of patient's Situation, Background, Assessment and Recommendations (SBAR).  ID bands were compared with the identification form, and verified with the patient and transferring nurse. Information from the SBAR, Procedure Summary, Intake/Output, MAR and Recent Results and the [place] Report was reviewed with the transferring nurse; opportunity for questions and clarification provided. Per SBAR pt had 700ml of urine out during Amy Romero RN recovery.

## 2018-05-09 NOTE — PROGRESS NOTES
SBAR OUT Report: Mother    Verbal report given to Opal Carvajal RN on this patient, who is now being transferred to MIU for routine progression of care. The patient is wearing a green \"Anesthesia-Duramorph\" band. Report consisted of patient's Situation, Background, Assessment and Recommendations (SBAR).  ID bands were compared with the identification form, and verified with the patient and receiving nurse. Information from the SBAR, Kardex, OR Summary, Intake/Output, MAR and Cardiac Rhythm NSR and the [place] Report was reviewed with the receiving nurse; opportunity for questions and clarification provided.

## 2018-05-09 NOTE — LACTATION NOTE

## 2018-05-09 NOTE — PROGRESS NOTES
Pt is S/P primary  at 40 5/7 weeks due to NR-FHTs. No complaints today. Normal PO pain. No GI/ issues. Lochia < menses. No F/C. Visit Vitals    BP 93/55 (BP 1 Location: Right arm, BP Patient Position: At rest)    Pulse 76    Temp 97.9 °F (36.6 °C)    Resp 16    SpO2 99%    Breastfeeding Yes        CV - RRR  LUNGS - CTA bilaterally  ABD - soft, appropriate tenderness, incision C/D/I  EXT - tr edema bilaterally    Labs:    Recent Results (from the past 24 hour(s))   RT--CORD BLOOD GAS    Collection Time: 18  6:28 PM   Result Value Ref Range    PH,CORD BLD ARTERIAL 7.277 7.21 - 7.31      PCO2,CORD BLD ARTERIAL 57 (H) 33 - 49 mmHg    PO2,CORD BLD ARTERIAL 15 9 - 19 mmHg    HCO3,CORD BLD ARTERIAL 26 22 - 26 mmol/L    BASE DEFICIT,CBA 1.9 0.0 - 2.0 mmol/L    SITE CORD     Respiratory comment: na at 2018 6 40 06 PM. Not read back. CORD BLOOD GAS VENOUS    Collection Time: 18  6:28 PM   Result Value Ref Range    PH,CORD BLD VENOUS 7.396 7.2 - 7.44      PCO2,CORD BLD VENOUS 39 14.1 - 43.3 mmHg    PO2,CORD BLD VENOUS 39 30.4 - 57.2 mmHg    HCO3,CORD BLD VENOUS 23 mmol/L    BASE DEFICIT,CBV 1.4 (L) 1.9 - 7.7 mmol/L    SITE CORD     Respiratory comment: na at 2018 6 42 32 PM. Not read back. CBC W/O DIFF    Collection Time: 18  6:43 AM   Result Value Ref Range    WBC 13.8 (H) 4.3 - 11.1 K/uL    RBC 3.06 (L) 4.05 - 5.25 M/uL    HGB 8.6 (L) 11.7 - 15.4 g/dL    HCT 27.5 (L) 35.8 - 46.3 %    MCV 89.9 79.6 - 97.8 FL    MCH 28.1 26.1 - 32.9 PG    MCHC 31.3 (L) 31.4 - 35.0 g/dL    RDW 14.6 11.9 - 14.6 %    PLATELET 414 193 - 936 K/uL    MPV 9.1 (L) 10.8 - 14.1 FL       POD #1 LTCS     Pt is breast feeding and plans on POP for birth control. Stable, cont.  routine PP/PO care    Lidia Crane MD  8:27 AM  18

## 2018-05-09 NOTE — PROGRESS NOTES
After completely deflating pt urinary catheter balloon, wells was removed without any issues. IV capped. SCD removed.  bandage removed, incision clean dry and intact, no sign/symptoms of infection, Steri strips remain in place. Pt educated on Chlorhexidine wash. Pt declines any pain at this time. Pt up and ambulated to bathroom with minimal assistance. Pt unable to void at this time. Maxx care taught, maxx pad changed, gown changed. Pt ambulated back to bed with no assistance. Pt back in bed with call light in reach.

## 2018-05-09 NOTE — LACTATION NOTE
This note was copied from a baby's chart. Assisted with attempt at breast in cross cradle on L. Baby sleepy. Very shallow latch, and did not stay on long. Mom has been doing some bottle feeding. Reviewed supply and demand. Started mom pumping. Encouraged attempt at breast and follow plan. Pump if giving formula.

## 2018-05-09 NOTE — PROGRESS NOTES
Attempted to meet with patient, but multiple family members were present.  will try back at a later time.     Ramiro Valle, [address] [facility]

## 2018-05-09 NOTE — PROGRESS NOTES
Pt noted to have aly urine. Pt has been sleeping. Encouraged pt to increase her fluid intake now that she is awake. Pt voiced understanding and denies any questions. Pt denies pain at this time. Angie pad changed for sanitary reasoning, small bleeding noted on pad. Pt resting in bed with call light in reach.

## 2018-05-09 NOTE — PROGRESS NOTES
Anesthesiology  Post-op Note    Post-op day 1 s/p  via epidural with neuraxial opioids for post-op pain management. Visit Vitals    BP 93/55 (BP 1 Location: Right arm, BP Patient Position: At rest)    Pulse 76    Temp 36.6 °C (97.9 °F)    Resp 16    LMP 2017 (Exact Date)    SpO2 99%    Breastfeeding Yes     Airway patent, patient appropriately hydrated and appears euvolemic. Patient is Alert and oriented. Pain is well controlled. Pruritus is moderately controlled. Nausea is absent. No complaints about back or site of injection. Motor and sensory function has returned to baseline in lower extremities. Patient is satisfied with anesthetic and reports no complications. Continue current orders, then initiate surgeon's orders for pain management 24 hours after . Follow up per surgeon.

## 2018-05-09 NOTE — PROGRESS NOTES
Admission assessment complete see flowsheet. Oriented pt to room and call light. Discussed tonight plan of care with pt. Pt voiced understanding. Pt appears aggravated and doesn't make much eye contact with RN. When pt was asked \"is everything ok\" pt states \"yes\". This nurse offered to have visitors leave but pt declines. Pt instructed to call if she feels any vaginal bleeding. Pt c/o itching at this time and would like medication. Will give Nubain see MAR. SCD on and working. Pt instructed not to get out of bed without call for assistance first. Pt voiced understanding. Pt resting in bed with call light in reach upon this nurse leaving the room.

## 2018-05-10 PROCEDURE — 74011250637 HC RX REV CODE- 250/637: Performed by: OBSTETRICS & GYNECOLOGY

## 2018-05-10 PROCEDURE — 65270000029 HC RM PRIVATE

## 2018-05-10 RX ORDER — OXYCODONE AND ACETAMINOPHEN 5; 325 MG/1; MG/1
1 TABLET ORAL
Qty: 24 TAB | Refills: 0 | Status: SHIPPED | OUTPATIENT
Start: 2018-05-10 | End: 2018-05-24

## 2018-05-10 RX ORDER — ACETAMINOPHEN AND CODEINE PHOSPHATE 120; 12 MG/5ML; MG/5ML
1 SOLUTION ORAL DAILY
Qty: 1 PACKAGE | Refills: 12 | Status: SHIPPED | OUTPATIENT
Start: 2018-05-10

## 2018-05-10 RX ORDER — IBUPROFEN 600 MG/1
600 TABLET ORAL
Qty: 60 TAB | Refills: 1 | Status: SHIPPED | OUTPATIENT
Start: 2018-05-10

## 2018-05-10 RX ADMIN — SIMETHICONE CHEW TAB 80 MG 80 MG: 80 TABLET ORAL at 00:16

## 2018-05-10 RX ADMIN — IBUPROFEN 600 MG: 600 TABLET ORAL at 06:39

## 2018-05-10 RX ADMIN — PRENATAL VIT W/ FE FUMARATE-FA TAB 27-0.8 MG 1 TABLET: 27-0.8 TAB at 08:07

## 2018-05-10 RX ADMIN — OXYCODONE HYDROCHLORIDE 5 MG: 5 TABLET ORAL at 00:16

## 2018-05-10 RX ADMIN — OXYCODONE HYDROCHLORIDE 10 MG: 5 TABLET ORAL at 04:12

## 2018-05-10 RX ADMIN — FERROUS SULFATE TAB 325 MG (65 MG ELEMENTAL FE) 325 MG: 325 (65 FE) TAB at 08:07

## 2018-05-10 RX ADMIN — OXYCODONE HYDROCHLORIDE 10 MG: 5 TABLET ORAL at 08:07

## 2018-05-10 RX ADMIN — DOCUSATE SODIUM 100 MG: 100 CAPSULE, LIQUID FILLED ORAL at 16:49

## 2018-05-10 RX ADMIN — SIMETHICONE CHEW TAB 80 MG 80 MG: 80 TABLET ORAL at 16:49

## 2018-05-10 RX ADMIN — SIMETHICONE CHEW TAB 80 MG 80 MG: 80 TABLET ORAL at 08:07

## 2018-05-10 RX ADMIN — SIMETHICONE CHEW TAB 80 MG 80 MG: 80 TABLET ORAL at 21:06

## 2018-05-10 RX ADMIN — IBUPROFEN 600 MG: 600 TABLET ORAL at 00:16

## 2018-05-10 RX ADMIN — FERROUS SULFATE TAB 325 MG (65 MG ELEMENTAL FE) 325 MG: 325 (65 FE) TAB at 16:49

## 2018-05-10 RX ADMIN — OXYCODONE HYDROCHLORIDE 10 MG: 5 TABLET ORAL at 12:37

## 2018-05-10 RX ADMIN — OXYCODONE HYDROCHLORIDE 10 MG: 5 TABLET ORAL at 21:06

## 2018-05-10 RX ADMIN — DOCUSATE SODIUM 100 MG: 100 CAPSULE, LIQUID FILLED ORAL at 08:06

## 2018-05-10 RX ADMIN — IBUPROFEN 600 MG: 600 TABLET ORAL at 19:39

## 2018-05-10 RX ADMIN — IBUPROFEN 600 MG: 600 TABLET ORAL at 12:37

## 2018-05-10 RX ADMIN — OXYCODONE HYDROCHLORIDE 10 MG: 5 TABLET ORAL at 16:49

## 2018-05-10 RX ADMIN — SIMETHICONE CHEW TAB 80 MG 80 MG: 80 TABLET ORAL at 12:37

## 2018-05-10 NOTE — PROGRESS NOTES
Pt is S/P primary  at 40 5/7 weeks due to NR-FHTs. No complaints today. Normal PO pain. No GI/ issues. Lochia < menses. No F/C. Visit Vitals    /65 (BP 1 Location: Right arm, BP Patient Position: At rest)    Pulse 72    Temp 98.4 °F (36.9 °C)    Resp 16    SpO2 99%    Breastfeeding Yes        CV - RRR  LUNGS - CTA bilaterally  ABD - soft, appropriate tenderness, incision C/D/I  EXT - tr edema bilaterally    Labs:  No results found for this or any previous visit (from the past 24 hour(s)). POD #2 LTCS     Pt is breast feeding and plans on POP for birth control. Pt desires D/c today  Stable, cont.  routine PP/PO instructions    Shira Norton MD  11:10 AM  05/10/18

## 2018-05-10 NOTE — PROGRESS NOTES
Shift assessment complete. Pt resting in bed at this time. Medications administered as ordered. See MAR. Questions encouraged and answered. Encouraged to call for needs or concerns. Verbalized understanding.

## 2018-05-10 NOTE — LACTATION NOTE
In to see mom and baby for lactation visit. Mom reports she is mostly formula feeding. Baby sometimes latches when she tries and sometimes does not. Discussed mom's goals with breastfeeding and she reports she does want baby to be able to latch some and pump and bottle feed some. Discussed supply and demand nature of milk production and that mom will typically produce more milk with increased stimulation to the breasts. Mom will need to either nurse baby or pump at each feeding to establish good supply. Has Ameda double electric pump at home. Plans to feed formula again at 12:30. Offered lactation assistance at this feeding if mom would like to put baby to the breast.  Mom will call out for lactation if desired.

## 2018-05-10 NOTE — PROGRESS NOTES
Chart reviewed due to first time parent. .   met with family and provided education on [facility] Postpartum  Home Visit Program.  Family declined referral for home visit as she is currently receiving support from [facility]. Patient confirms history of depression. She was on Prozac prior to pregnancy, but stopped all meds when she became pregnant as she didn't want baby exposed to medication. Patient reports that she \"couldn't tell a difference\" when she discontinued the Prozac.  provided informational packet on  mood disorder education/resources. Family receptive to receiving information and denied any additional needs from . Family has this 's contact information should any needs/questions arise.     Jody Green, [address] [facility]

## 2018-05-11 VITALS
DIASTOLIC BLOOD PRESSURE: 56 MMHG | OXYGEN SATURATION: 99 % | RESPIRATION RATE: 18 BRPM | TEMPERATURE: 98.3 F | HEART RATE: 83 BPM | SYSTOLIC BLOOD PRESSURE: 110 MMHG

## 2018-05-11 PROCEDURE — 90715 TDAP VACCINE 7 YRS/> IM: CPT | Performed by: OBSTETRICS & GYNECOLOGY

## 2018-05-11 PROCEDURE — 74011250637 HC RX REV CODE- 250/637: Performed by: OBSTETRICS & GYNECOLOGY

## 2018-05-11 PROCEDURE — 74011250636 HC RX REV CODE- 250/636: Performed by: OBSTETRICS & GYNECOLOGY

## 2018-05-11 RX ADMIN — OXYCODONE HYDROCHLORIDE 10 MG: 5 TABLET ORAL at 16:14

## 2018-05-11 RX ADMIN — OXYCODONE HYDROCHLORIDE 10 MG: 5 TABLET ORAL at 12:49

## 2018-05-11 RX ADMIN — IBUPROFEN 600 MG: 600 TABLET ORAL at 09:24

## 2018-05-11 RX ADMIN — IBUPROFEN 600 MG: 600 TABLET ORAL at 16:14

## 2018-05-11 RX ADMIN — FERROUS SULFATE TAB 325 MG (65 MG ELEMENTAL FE) 325 MG: 325 (65 FE) TAB at 09:24

## 2018-05-11 RX ADMIN — SIMETHICONE CHEW TAB 80 MG 80 MG: 80 TABLET ORAL at 09:24

## 2018-05-11 RX ADMIN — IBUPROFEN 600 MG: 600 TABLET ORAL at 01:07

## 2018-05-11 RX ADMIN — DOCUSATE SODIUM 100 MG: 100 CAPSULE, LIQUID FILLED ORAL at 09:24

## 2018-05-11 RX ADMIN — SIMETHICONE CHEW TAB 80 MG 80 MG: 80 TABLET ORAL at 12:49

## 2018-05-11 RX ADMIN — OXYCODONE HYDROCHLORIDE 10 MG: 5 TABLET ORAL at 06:01

## 2018-05-11 RX ADMIN — TETANUS TOXOID, REDUCED DIPHTHERIA TOXOID AND ACELLULAR PERTUSSIS VACCINE, ADSORBED 0.5 ML: 5; 2.5; 8; 8; 2.5 SUSPENSION INTRAMUSCULAR at 12:50

## 2018-05-11 RX ADMIN — OXYCODONE HYDROCHLORIDE 10 MG: 5 TABLET ORAL at 01:07

## 2018-05-11 NOTE — PROGRESS NOTES
Discharge teaching complete. Pt verbalized understanding, questions encouraged. .  Pt. Stable and discharged to home per MD order. Pt. Left unit via ambulate with family ,  in carseat. Andrews escorted off unit by MIU staff. Pt. To home via private automobile.

## 2018-05-11 NOTE — DISCHARGE INSTRUCTIONS
Section: What to Expect at [address] [place] [address]    A  section, or , is surgery to deliver your baby through a cut, called an incision, that the doctor makes in your lower belly and uterus. You may have some pain in your lower belly and need pain medicine for 1 to 2 weeks. You can expect some vaginal bleeding for several weeks. You will probably need about 6 weeks to fully recover. It is important to take it easy while the incision is healing. Avoid heavy lifting, strenuous activities, or exercises that strain the belly muscles while you are recovering. Ask a family member or friend for help with housework, cooking, and shopping. This care sheet gives you a general idea about how long it will take for you to recover. But each person recovers at a different pace. Follow the steps below to get better as quickly as possible. How can you care for yourself at home? Activity  ? · Rest when you feel tired. Getting enough sleep will help you recover. ? · Try to walk each day. Start by walking a little more than you did the day before. Bit by bit, increase the amount you walk. Walking boosts blood flow and helps prevent pneumonia, constipation, and blood clots. ? · Avoid strenuous activities, such as bicycle riding, jogging, weightlifting, and aerobic exercise, for 6 weeks or until your doctor says it is okay. ? · Until your doctor says it is okay, do not lift anything heavier than your baby. ? · Do not do sit-ups or other exercises that strain the belly muscles for 6 weeks or until your doctor says it is okay. ? · Hold a pillow over your incision when you cough or take deep breaths. This will support your belly and decrease your pain. ? · You may shower as usual. Pat the incision dry when you are done. ? · You will have some vaginal bleeding. Wear sanitary pads. Do not douche or use tampons until your doctor says it is okay. ? · Ask your doctor when you can drive again. ? · You will probably need to take at least 6 weeks off work. It depends on the type of work you do and how you feel. ? · Ask your doctor when it is okay for you to have sex. Diet  ? · You can eat your normal diet. If your stomach is upset, try bland, low-fat foods like plain rice, broiled chicken, toast, and yogurt. ? · Drink plenty of fluids (unless your doctor tells you not to). ? · You may notice that your bowel movements are not regular right after your surgery. This is common. Try to avoid constipation and straining with bowel movements. You may want to take a fiber supplement every day. If you have not had a bowel movement after a couple of days, ask your doctor about taking a mild laxative. ? · If you are breastfeeding, do not drink any alcohol. Medicines  ? · Your doctor will tell you if and when you can restart your medicines. He or she will also give you instructions about taking any new medicines. ? · If you take blood thinners, such as warfarin (Coumadin), clopidogrel (Plavix), or aspirin, be sure to talk to your doctor. He or she will tell you if and when to start taking those medicines again. Make sure that you understand exactly what your doctor wants you to do. ? · Take pain medicines exactly as directed. ¨ If the doctor gave you a prescription medicine for pain, take it as prescribed. ¨ If you are not taking a prescription pain medicine, ask your doctor if you can take an over-the-counter medicine. ? · If you think your pain medicine is making you sick to your stomach:  ¨ Take your medicine after meals (unless your doctor has told you not to). ¨ Ask your doctor for a different pain medicine. ? · If your doctor prescribed antibiotics, take them as directed. Do not stop taking them just because you feel better. You need to take the full course of antibiotics. Incision care  ?  · If you have strips of tape on the incision, leave the tape on for a week or until it falls off.   ? · Wash the area daily with warm, soapy water, and pat it dry. Don't use hydrogen peroxide or alcohol, which can slow healing. You may cover the area with a gauze bandage if it weeps or rubs against clothing. Change the bandage every day. ? · Keep the area clean and dry. Other instructions  ? · If you breastfeed your baby, you may be more comfortable while you are healing if you place the baby so that he or she is not resting on your belly. Try tucking your baby under your arm, with his or her body along the side you will be feeding on. Support your baby's upper body with your arm. With that hand you can control your baby's head to bring his or her mouth to your breast. This is sometimes called the football hold. Follow-up care is a key part of your treatment and safety. Be sure to make and go to all appointments, and call your doctor if you are having problems. It's also a good idea to know your test results and keep a list of the medicines you take. When should you call for help? Call 911 anytime you think you may need emergency care. For example, call if:  ? · You passed out (lost consciousness). ? · You have chest pain, are short of breath, or cough up blood. ?Call your doctor now or seek immediate medical care if:  ? · You have pain that does not get better after you take pain medicine. ? · You have severe vaginal bleeding. ? · You are dizzy or lightheaded, or you feel like you may faint. ? · You have new or worse pain in your belly or pelvis. ? · You have loose stitches, or your incision comes open. ? · You have symptoms of infection, such as:  ¨ Increased pain, swelling, warmth, or redness. ¨ Red streaks leading from the incision. ¨ Pus draining from the incision. ¨ A fever. ? · You have symptoms of a blood clot in your leg (called a deep vein thrombosis), such as:  ¨ Pain in your calf, back of the knee, thigh, or groin. ¨ Redness and swelling in your leg or groin. ? Watch closely for changes in your health, and be sure to contact your doctor if:  ? · You do not get better as expected. Where can you learn more? Go to http://da-marah.info/. Enter M806 in the search box to learn more about \" Section: What to Expect at Home. \"  Current as of: 2017  Content Version: 11.4  © 7322-9190 [employer]. Care instructions adapted under license by [employer] (which disclaims liability or warranty for this information). If you have questions about a medical condition or this instruction, always ask your healthcare professional. [place] [address] any warranty or liability for your use of this information. DISCHARGE SUMMARY from Nurse    PATIENT INSTRUCTIONS:    After general anesthesia or intravenous sedation, for 24 hours or while taking prescription Narcotics:  · Limit your activities  · Do not drive and operate hazardous machinery  · Do not make important personal or business decisions  · Do  not drink alcoholic beverages  · If you have not urinated within 8 hours after discharge, please contact your surgeon on call. Report the following to your surgeon:  · Excessive pain, swelling, redness or odor of or around the surgical area  · Temperature over 100.5  · Nausea and vomiting lasting longer than 4 hours or if unable to take medications  · Any signs of decreased circulation or nerve impairment to extremity: change in color, persistent  numbness, tingling, coldness or increase pain  · Any questions    What to do at Home:    *  Please give a list of your current medications to your Primary Care Provider. *  Please update this list whenever your medications are discontinued, doses are      changed, or new medications (including over-the-counter products) are added. *  Please carry medication information at all times in case of emergency situations.     These are general instructions for a healthy lifestyle:    No smoking/ No tobacco products/ Avoid exposure to second hand smoke  Surgeon General's Warning:  Quitting smoking now greatly reduces serious risk to your health. Obesity, smoking, and sedentary lifestyle greatly increases your risk for illness    A healthy diet, regular physical exercise & weight monitoring are important for maintaining a healthy lifestyle    You may be retaining fluid if you have a history of heart failure or if you experience any of the following symptoms:  Weight gain of 3 pounds or more overnight or 5 pounds in a week, increased swelling in our hands or feet or shortness of breath while lying flat in bed. Please call your doctor as soon as you notice any of these symptoms; do not wait until your next office visit. Recognize signs and symptoms of STROKE:    F-face looks uneven    A-arms unable to move or move unevenly    S-speech slurred or non-existent    T-time-call 911 as soon as signs and symptoms begin-DO NOT go       Back to bed or wait to see if you get better-TIME IS BRAIN. Warning Signs of HEART ATTACK     Call 911 if you have these symptoms:   Chest discomfort. Most heart attacks involve discomfort in the center of the chest that lasts more than a few minutes, or that goes away and comes back. It can feel like uncomfortable pressure, squeezing, fullness, or pain.  Discomfort in other areas of the upper body. Symptoms can include pain or discomfort in one or both arms, the back, neck, jaw, or stomach.  Shortness of breath with or without chest discomfort.  Other signs may include breaking out in a cold sweat, nausea, or lightheadedness. Don't wait more than five minutes to call 911 - MINUTES MATTER! Fast action can save your life. Calling 911 is almost always the fastest way to get lifesaving treatment. Emergency Medical Services staff can begin treatment when they arrive -- up to an hour sooner than if someone gets to the hospital by car.      The discharge information has been reviewed with the patient. The patient verbalized understanding. Discharge medications reviewed with the patient and appropriate educational materials and side effects teaching were provided.   ___________________________________________________________________________________________________________________________________

## 2018-05-22 NOTE — DISCHARGE SUMMARY
Date of Admission:  2018 10:15 PM  Date of Discharge:  2018  4:00 PM    Patient is a 24 y.o.  at 40w5d wks who was admitted for postdates induction. Pt subsequently underwent  for non-reassuring FHTs. A Low Transverse  was performed with normal amount of blood loss. On Post Op day #1, the patient had her catheter removed and was ambulating well in the whipple. Her post operative hemoglobin was stable. Patient had a normal post operative course. Incision stayed clean and dry, without erythema. Patient remained afebrile throughout the entire hospital stay. On day of discharge, she was discharged home in good condition with routine post  instructions and scheduled to follow up in two weeks for an incision check at [address]. She was breast feeding the infant and plans on POP for birth control. Discharge Meds:    Discharge Medication List as of 2018  1:03 PM      START taking these medications    Details   ibuprofen (MOTRIN) 600 mg tablet Take 1 Tab by mouth every six (6) hours as needed., Normal, Disp-60 Tab, R-1      oxyCODONE-acetaminophen (PERCOCET) 5-325 mg per tablet Take 1 Tab by mouth every four (4) hours as needed for Pain. Max Daily Amount: 6 Tabs., Print, Disp-24 Tab, R-0      norethindrone (MICRONOR) 0.35 mg tab Take 1 Tab by mouth daily. , Normal, Disp-1 Package, R-12         CONTINUE these medications which have NOT CHANGED    Details   ferrous sulfate (IRON) 325 mg (65 mg iron) tablet Take  by mouth Daily (before breakfast). , Historical Med      docusate sodium (STOOL SOFTENER) 100 mg tab Take  by mouth., Historical Med      prenatal vit-iron fumarate-fa (JOSE RAMON PRENATAL) 28 mg iron- 800 mcg tab Take 1 Tab by mouth daily. , Kiko Valenzuela MD  9:48 AM  18

## 2018-05-23 PROBLEM — F32.A DEPRESSION AFFECTING PREGNANCY IN FIRST TRIMESTER, ANTEPARTUM: Status: RESOLVED | Noted: 2017-09-21 | Resolved: 2018-05-23

## 2018-05-23 PROBLEM — O99.013 ANEMIA IN PREGNANCY, THIRD TRIMESTER: Status: RESOLVED | Noted: 2018-02-14 | Resolved: 2018-05-23

## 2018-05-23 PROBLEM — L29.9 PRURITUS OF PREGNANCY IN THIRD TRIMESTER: Status: RESOLVED | Noted: 2018-03-29 | Resolved: 2018-05-23

## 2018-05-23 PROBLEM — O99.341 DEPRESSION AFFECTING PREGNANCY IN FIRST TRIMESTER, ANTEPARTUM: Status: RESOLVED | Noted: 2017-09-21 | Resolved: 2018-05-23

## 2018-05-23 PROBLEM — Z34.83 MULTIGRAVIDA IN THIRD TRIMESTER: Status: RESOLVED | Noted: 2017-09-21 | Resolved: 2018-05-23

## 2018-05-23 PROBLEM — O99.820 GBS (GROUP B STREPTOCOCCUS CARRIER), +RV CULTURE, CURRENTLY PREGNANT: Status: RESOLVED | Noted: 2018-04-09 | Resolved: 2018-05-23

## 2018-05-23 PROBLEM — O99.713 PRURITUS OF PREGNANCY IN THIRD TRIMESTER: Status: RESOLVED | Noted: 2018-03-29 | Resolved: 2018-05-23

## 2018-05-24 PROBLEM — Z37.9 NORMAL LABOR: Status: RESOLVED | Noted: 2018-05-09 | Resolved: 2018-05-24

## 2018-05-24 PROBLEM — Z09 POSTOPERATIVE EXAMINATION: Status: ACTIVE | Noted: 2018-05-24

## 2018-05-24 PROBLEM — Z34.03 PRIMIGRAVIDA IN THIRD TRIMESTER: Status: RESOLVED | Noted: 2018-05-08 | Resolved: 2018-05-24

## 2018-05-24 PROBLEM — Z3A.40 40 WEEKS GESTATION OF PREGNANCY: Status: RESOLVED | Noted: 2018-05-09 | Resolved: 2018-05-24

## 2018-06-21 PROBLEM — Z09 POSTOPERATIVE EXAMINATION: Status: RESOLVED | Noted: 2018-05-24 | Resolved: 2018-06-21

## 2018-06-28 PROBLEM — R87.810 ASCUS WITH POSITIVE HIGH RISK HPV CERVICAL: Status: ACTIVE | Noted: 2018-06-28

## 2018-06-28 PROBLEM — R87.610 ASCUS WITH POSITIVE HIGH RISK HPV CERVICAL: Status: ACTIVE | Noted: 2018-06-28

## 2018-07-12 PROCEDURE — 88305 TISSUE EXAM BY PATHOLOGIST: CPT | Performed by: OBSTETRICS & GYNECOLOGY

## 2018-07-13 ENCOUNTER — HOSPITAL ENCOUNTER (OUTPATIENT)
Dept: LAB | Age: 28
Discharge: HOME | End: 2018-07-13

## 2019-08-25 NOTE — PROGRESS NOTES
Pt resting in bed with no complaints at this time. Pt states itching has improved. Call light within reach and FOB at bedside. Ambulatory

## 2019-12-19 NOTE — PROGRESS NOTES
Assessment completed. Plan of care discussed with patient. Patient to call out if any other needs. yes...

## 2022-07-05 RX ORDER — SERTRALINE HYDROCHLORIDE 25 MG/1
TABLET, FILM COATED ORAL
COMMUNITY
Start: 2022-04-13 | End: 2022-09-09 | Stop reason: DRUGHIGH

## 2022-07-05 RX ORDER — ETONOGESTREL AND ETHINYL ESTRADIOL 11.7; 2.7 MG/1; MG/1
INSERT, EXTENDED RELEASE VAGINAL
COMMUNITY
Start: 2022-01-19

## 2022-07-05 RX ORDER — ONDANSETRON 4 MG/1
TABLET, FILM COATED ORAL
COMMUNITY
Start: 2022-02-23

## 2022-07-05 RX ORDER — ESCITALOPRAM OXALATE 20 MG/1
TABLET ORAL
COMMUNITY
End: 2022-09-09 | Stop reason: ALTCHOICE

## 2022-09-09 PROBLEM — F33.2 SEVERE EPISODE OF RECURRENT MAJOR DEPRESSIVE DISORDER, WITHOUT PSYCHOTIC FEATURES (HCC): Status: ACTIVE | Noted: 2022-09-09

## 2022-09-09 PROBLEM — N92.0 MENORRHAGIA WITH REGULAR CYCLE: Status: ACTIVE | Noted: 2022-09-09

## 2022-09-09 PROBLEM — D64.9 ABSOLUTE ANEMIA: Status: ACTIVE | Noted: 2022-09-09

## 2022-09-09 PROBLEM — F41.9 ANXIETY: Status: ACTIVE | Noted: 2022-09-09

## (undated) DEVICE — SUTURE PDS II SZ 0 L27IN ABSRB VLT L36MM CT-1 1/2 CIR Z340H

## (undated) DEVICE — 3M™ STERI-STRIP™ REINFORCED ADHESIVE SKIN CLOSURES, R1549, 1/2 IN X 2 IN (12 MM X 50 MM), 6 STRIPS/ENVELOPE: Brand: 3M™ STERI-STRIP™

## (undated) DEVICE — STERILE POLYISOPRENE POWDER-FREE SURGICAL GLOVES: Brand: PROTEXIS

## (undated) DEVICE — PENCIL ES L3M BTTN SWCH S STL HEX LOK BLDE ELECTRD HOLSTER

## (undated) DEVICE — SURGICAL PROCEDURE PACK C SECT CDS

## (undated) DEVICE — AMD ANTIMICROBIAL NON-ADHERENT PAD,0.2% POLYHEXAMETHYLENE BIGUANIDE HCI (PHMB): Brand: TELFA

## (undated) DEVICE — MEDI-VAC NON-CONDUCTIVE SUCTION TUBING: Brand: CARDINAL HEALTH

## (undated) DEVICE — SUTURE MCRYL SZ 4-0 L27IN ABSRB UD L19MM PS-2 1/2 CIR PRIM Y426H

## (undated) DEVICE — PREMIUM WET SKIN PREP TRAY: Brand: MEDLINE INDUSTRIES, INC.

## (undated) DEVICE — SUTURE VCRL SZ 0 L36IN ABSRB UD L36MM CT-1 1/2 CIR J946H

## (undated) DEVICE — Device: Brand: PORTEX

## (undated) DEVICE — BARRIER TISS ADH ABSRB 3X4IN -- GYNECARE INTERCEED

## (undated) DEVICE — SOLUTION IV 1000ML 0.9% SOD CHL

## (undated) DEVICE — (D)STRIP SKN CLSR 0.5X4IN WHT --

## (undated) DEVICE — REM POLYHESIVE ADULT PATIENT RETURN ELECTRODE: Brand: VALLEYLAB

## (undated) DEVICE — SOLUTION IRRIG 1000ML H2O STRL BLT

## (undated) DEVICE — TRAY CATH 16FR PVC INTMIT STR TIP PREATTACH TO 1000ML COLL

## (undated) DEVICE — SUTURE MCRYL SZ 3-0 L27IN ABSRB UD L60MM KS STR REV CUT Y523H

## (undated) DEVICE — AMD ANTIMICROBIAL GAUZE SPONGES,12 PLY USP TYPE VII, 0.2% POLYHEXAMETHYLENE BIGUANIDE HCI (PHMB): Brand: CURITY

## (undated) DEVICE — KENDALL SCD EXPRESS SLEEVES, KNEE LENGTH, MEDIUM: Brand: KENDALL SCD

## (undated) DEVICE — SUTURE VCRL RAPIDE SZ 3-0 L36IN ABSRB UD L36MM CT-1 1/2 CIR VR944